# Patient Record
Sex: MALE | Race: WHITE | NOT HISPANIC OR LATINO | Employment: OTHER | URBAN - METROPOLITAN AREA
[De-identification: names, ages, dates, MRNs, and addresses within clinical notes are randomized per-mention and may not be internally consistent; named-entity substitution may affect disease eponyms.]

---

## 2017-01-03 ENCOUNTER — HOSPITAL ENCOUNTER (OUTPATIENT)
Dept: SLEEP MEDICINE | Facility: HOSPITAL | Age: 47
Discharge: HOME | End: 2017-01-03
Attending: INTERNAL MEDICINE | Admitting: INTERNAL MEDICINE

## 2017-01-03 VITALS
HEART RATE: 87 BPM | HEIGHT: 70 IN | DIASTOLIC BLOOD PRESSURE: 76 MMHG | WEIGHT: 249 LBS | BODY MASS INDEX: 35.65 KG/M2 | OXYGEN SATURATION: 96 % | SYSTOLIC BLOOD PRESSURE: 143 MMHG

## 2017-01-03 DIAGNOSIS — G47.61 PERIODIC LIMB MOVEMENT DISORDER (PLMD): ICD-10-CM

## 2017-01-03 DIAGNOSIS — G47.10 HYPERSOMNIA: ICD-10-CM

## 2017-01-03 DIAGNOSIS — G47.33 OBSTRUCTIVE SLEEP APNEA SYNDROME: ICD-10-CM

## 2017-01-03 PROCEDURE — 95810 POLYSOM 6/> YRS 4/> PARAM: CPT | Performed by: INTERNAL MEDICINE

## 2017-01-04 PROCEDURE — 95810 POLYSOM 6/> YRS 4/> PARAM: CPT | Performed by: INTERNAL MEDICINE

## 2017-01-10 ENCOUNTER — TELEPHONE (OUTPATIENT)
Dept: CARDIOLOGY | Facility: CLINIC | Age: 47
End: 2017-01-10

## 2017-01-10 DIAGNOSIS — I48.0 PAROXYSMAL ATRIAL FIBRILLATION (HCC): Primary | ICD-10-CM

## 2017-01-10 RX ORDER — FLECAINIDE ACETATE 100 MG/1
100 TABLET ORAL 2 TIMES DAILY
Qty: 60 TABLET | Refills: 5 | Status: SHIPPED | OUTPATIENT
Start: 2017-01-10 | End: 2017-08-30 | Stop reason: SDUPTHER

## 2017-01-10 NOTE — TELEPHONE ENCOUNTER
Per GFT I instructed patient to Stop Rythmol. Wait 3 days and start Tambocor 100 mg BID. Then have an EKG 48 hours later. Patient is agreeable. He is going to see Dr Milian on 1/17/17 and will have EKG done there and fax it to our office. RX sent to pharmacy for Tambocor.

## 2017-01-10 NOTE — TELEPHONE ENCOUNTER
Patient called and he had just gotten your message. He said that the best number to reach him at is [phone].

## 2017-01-24 ENCOUNTER — OUTSIDE FACILITY SERVICE (OUTPATIENT)
Dept: CARDIOLOGY | Facility: CLINIC | Age: 47
End: 2017-01-24

## 2017-01-24 PROCEDURE — 93270 REMOTE 30 DAY ECG REV/REPORT: CPT | Performed by: INTERNAL MEDICINE

## 2017-01-24 PROCEDURE — 93272 ECG/REVIEW INTERPRET ONLY: CPT | Performed by: INTERNAL MEDICINE

## 2017-01-27 ENCOUNTER — TELEPHONE (OUTPATIENT)
Dept: CARDIOLOGY | Facility: CLINIC | Age: 47
End: 2017-01-27

## 2017-01-27 RX ORDER — DILTIAZEM HYDROCHLORIDE 240 MG/1
240 CAPSULE, COATED, EXTENDED RELEASE ORAL DAILY
Qty: 30 CAPSULE | Refills: 5 | Status: SHIPPED | OUTPATIENT
Start: 2017-01-27 | End: 2017-08-02 | Stop reason: SDUPTHER

## 2017-01-27 NOTE — TELEPHONE ENCOUNTER
GFT reviewed event monitor and would like pt to increase Diltiazem to 240mg daily.  I spoke with pt, he will discontinue the 180mg and start the 240 monitoring HR and BP.

## 2017-03-16 ENCOUNTER — OFFICE VISIT (OUTPATIENT)
Dept: CARDIOLOGY | Facility: CLINIC | Age: 47
End: 2017-03-16

## 2017-03-16 VITALS
HEIGHT: 70 IN | WEIGHT: 240 LBS | BODY MASS INDEX: 34.36 KG/M2 | SYSTOLIC BLOOD PRESSURE: 132 MMHG | HEART RATE: 66 BPM | DIASTOLIC BLOOD PRESSURE: 84 MMHG

## 2017-03-16 DIAGNOSIS — I48.0 PAROXYSMAL A-FIB (HCC): Primary | ICD-10-CM

## 2017-03-16 DIAGNOSIS — I48.4 ATYPICAL ATRIAL FLUTTER (HCC): ICD-10-CM

## 2017-03-16 PROBLEM — I48.92 ATRIAL FLUTTER: Status: ACTIVE | Noted: 2017-03-16

## 2017-03-16 PROCEDURE — 93000 ELECTROCARDIOGRAM COMPLETE: CPT | Performed by: INTERNAL MEDICINE

## 2017-03-16 PROCEDURE — 99213 OFFICE O/P EST LOW 20 MIN: CPT | Performed by: INTERNAL MEDICINE

## 2017-03-16 RX ORDER — ATORVASTATIN CALCIUM 20 MG/1
20 TABLET, FILM COATED ORAL DAILY
COMMUNITY
End: 2021-08-19 | Stop reason: ALTCHOICE

## 2017-03-16 RX ORDER — PROPAFENONE HYDROCHLORIDE 300 MG/1
300 TABLET, COATED ORAL DAILY
COMMUNITY
End: 2017-08-31 | Stop reason: ALTCHOICE

## 2017-03-16 NOTE — PROGRESS NOTES
Bakari Rosales  1970  [phone]      03/16/2017    [facility] CARDIOLOGY     DAYSI Hager  [address]   [place] [id number]    Chief Complaint   Patient presents with   • Atrial Fibrillation         Problem List:   1. Atrial fibrillation: CHADSvasc = 1  a. Holter monitor, 12/08/2010, with heart rates of , average 52 beats per minute with 3% PACs. No PVCs and normal sinus rhythm.   b. Echocardiogram, 12/03/2012: Normal study, EF 60%.   c. GXT, February 2013: Normal study per patient, no data.   d. Holter monitor, 07/30/2013, no data, showing apparent atrial fibrillation.  e. Transesophageal echocardiogram, August 2013, showing thrombus, Xarelto initiated, Rythmol changed to sotalol, no external cardioversion performed.   f. EKG, 08/17/2013, showed atrial fibrillation at 58 beats per minute.   g. Failed sotalol: Tikosyn initiation, 02/11/2014.   h. PV isolation procedure, 09/09/2014: Abnormal CT scan of the chest, 09/08/2014, with enlarged lymph nodes in the mediastinum.   i. 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 Event Monitor 12/2016-1/2017 showing nsr, ST, atrial flutter, stopped Rythmol and started Flecainide  2. Emphysema/chronic obstructive pulmonary disease:  a. Abnormal CT scan of the chest, 09/08/2014 with enlarged lymph nodes in the mediastinum.   b. Repeat CT scan of the chest, 11/19/2014, reactive lymph nodes in the mediastinum, predominant interstitial nodularity and linear densities and possible emphysema with early fibrosis.   c. Repeat CT scan 10/2016: There are fairly subtle peripheral interstitial changes noted primarily in the lower lobes. However there are no features of kit pulmonary fibrosis. There is no bronchiectasis, evidence of acute inflammatory process or mass  3. GXT, February 2013: Normal study per patient, no data.    4. Hypothyroidism.  5. Dyslipidemia.  6. Anxiety.  7. Tobacco abuse.  8. Hypertension.    Allergies  No Known Allergies    Current Medications    Current Outpatient Prescriptions:   •  ALPRAZolam (XANAX) 2 MG tablet, Take 2 mg by mouth 3 (three) times a day as needed., Disp: , Rfl:   •  atorvastatin (LIPITOR) 20 MG tablet, Take 20 mg by mouth Daily., Disp: , Rfl:   •  diltiaZEM CD (CARDIZEM CD) 240 MG 24 hr capsule, Take 1 capsule by mouth Daily., Disp: 30 capsule, Rfl: 5  •  Ergocalciferol (VITAMIN D2 PO), Take 50,000 Units by mouth 1 (one) time per week., Disp: , Rfl:   •  flecainide (TAMBOCOR) 100 MG tablet, Take 1 tablet by mouth 2 (Two) Times a Day., Disp: 60 tablet, Rfl: 5  •  furosemide (LASIX) 20 MG tablet, Take 20 mg by mouth daily as needed., Disp: , Rfl:   •  gemfibrozil (LOPID) 600 MG tablet, Take 600 mg by mouth 2 (two) times a day before meals., Disp: , Rfl:   •  hydrOXYzine (VISTARIL) 50 MG capsule, take 1 capsule by mouth four times a day, Disp: , Rfl: 0  •  levothyroxine (SYNTHROID, LEVOTHROID) 100 MCG tablet, Take 100 mcg by mouth., Disp: , Rfl:   •  metoprolol tartrate (LOPRESSOR) 50 MG tablet, Take 50 mg by mouth 2 (two) times a day., Disp: , Rfl:   •  pantoprazole (PROTONIX) 40 MG EC tablet, Take 1 tablet by mouth daily., Disp: 30 tablet, Rfl: 6  •  propafenone (RYTHMOL) 300 MG tablet, Take 300 mg by mouth 2 (Two) Times a Day., Disp: , Rfl:   •  RA LAXATIVE 5 MG EC tablet, take 1 tablet by mouth twice a day, Disp: , Rfl: 0  •  rivaroxaban (XARELTO) 20 MG tablet, Take 20 mg by mouth daily with dinner., Disp: , Rfl:     History of Present Illness   HPI    Pt presents for follow up of atrial fibrillation and flutter. Since we last saw the pt, he wore an event monitor which showed nsr, ST, and short runs of SVT/atrial flutter. Dr. Luz stopped his Rythmol and started Flecainide. However, the patient misunderstood and has been taking both. He still has daily episodes of palpitations and atrial  "fibrillation lasting anywhere from 20 minutes to several hours. He feels terrible and struggles with anxiety, depression and hopelessness. Because of this atrial fib, he cannot do anything he wants to do. He has gained weight. He is cutting back on smoking. He had sleep study which did not show need for CPAP.     ROS:  General:  Positive fatigue, weight gain   Cardiovascular:  Denies CP, PND, syncope, near syncope, edema or palpitations.  Pulmonary:  Denies SALAZAR, cough, or wheezing      Vitals:    03/16/17 1107   BP: 132/84   BP Location: Left arm   Patient Position: Sitting   Pulse: 66   Weight: 240 lb (109 kg)   Height: 70\" (177.8 cm)     PE:  General: NAD  Neck: no JVD, no carotid bruits, no TM  Heart RRR, NL S1, S2, S4 present, no rubs, murmurs  Lungs: CTA, no wheezes, rhonchi, or rales  Abd: soft, non-tender, NL BS  Ext: No musculoskeletal deformities, no edema, cyanosis, or clubbing  Psych: normal mood and affect    Diagnostic Data:    ECG 12 Lead  Date/Time: 3/16/2017 12:34 PM  Performed by: DANIELLE LUZ  Authorized by: DANIELLE LUZ   Comparison: compared with previous ECG from 12/5/2016  Rhythm: sinus rhythm  BPM: 66              1. Paroxysmal a-fib    2. Atypical atrial flutter          Plan:  1) PAF/atrial flutter- monitor shows mainly nsr with rare short runs of AT/SVT: Pt cannot discern between nsr and short runs of AT. Will continued on Tambocor only. Monitor for now    2) Anticoagulation: CHADvasc = 1  Continue Xarelto  3) HTN- controlled  Wt loss, exercise, salt reduction    Scribed for Danielle Luz MD by Bhavya Locke PA-C. 3/16/2017  12:08 PM     IDanielle MD, personally performed the services face to face as described in this documentation and as scribed by the above named individual in my presence, and it is both accurate and complete.  3/16/2017  12:33 PM        "

## 2017-08-03 RX ORDER — DILTIAZEM HYDROCHLORIDE 240 MG/1
CAPSULE, EXTENDED RELEASE ORAL
Qty: 30 CAPSULE | Refills: 5 | Status: SHIPPED | OUTPATIENT
Start: 2017-08-03 | End: 2018-02-02 | Stop reason: SDUPTHER

## 2017-08-29 ENCOUNTER — OFFICE VISIT (OUTPATIENT)
Dept: CARDIOLOGY | Facility: CLINIC | Age: 47
End: 2017-08-29

## 2017-08-29 VITALS
SYSTOLIC BLOOD PRESSURE: 105 MMHG | WEIGHT: 230 LBS | BODY MASS INDEX: 32.93 KG/M2 | DIASTOLIC BLOOD PRESSURE: 67 MMHG | HEART RATE: 65 BPM | HEIGHT: 70 IN

## 2017-08-29 DIAGNOSIS — I10 ESSENTIAL HYPERTENSION: ICD-10-CM

## 2017-08-29 DIAGNOSIS — I48.0 PAROXYSMAL A-FIB (HCC): Primary | ICD-10-CM

## 2017-08-29 PROCEDURE — 93000 ELECTROCARDIOGRAM COMPLETE: CPT | Performed by: PHYSICIAN ASSISTANT

## 2017-08-29 PROCEDURE — 99213 OFFICE O/P EST LOW 20 MIN: CPT | Performed by: INTERNAL MEDICINE

## 2017-08-29 NOTE — PROGRESS NOTES
Bakari Rosales  1970  [phone]      08/29/2017    [facility] CARDIOLOGY     DAYSI Hager  [address]   [place] [id number]    Chief Complaint   Patient presents with   • Atrial Fibrillation       Problem List:   Problem List:   1. Atrial fibrillation: CHADSvasc = 1  a. Holter monitor, 12/08/2010, with heart rates of , average 52 beats per minute with 3% PACs. No PVCs and normal sinus rhythm.   b. Echocardiogram, 12/03/2012: Normal study, EF 60%.   c. GXT, February 2013: Normal study per patient, no data.   d. Holter monitor, 07/30/2013, no data, showing apparent atrial fibrillation.  e. Transesophageal echocardiogram, August 2013, showing thrombus, Xarelto initiated, Rythmol changed to sotalol, no external cardioversion performed.   f. EKG, 08/17/2013, showed atrial fibrillation at 58 beats per minute.   g. Failed sotalol: Tikosyn initiation, 02/11/2014.   h. PV isolation procedure, 09/09/2014: Abnormal CT scan of the chest, 09/08/2014, with enlarged lymph nodes in the mediastinum.   i. 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 Event Monitor 12/2016-1/2017 showing nsr, ST, atrial flutter, stopped Rythmol and started Flecainide  2. Emphysema/chronic obstructive pulmonary disease:  a. Abnormal CT scan of the chest, 09/08/2014 with enlarged lymph nodes in the mediastinum.   b. Repeat CT scan of the chest, 11/19/2014, reactive lymph nodes in the mediastinum, predominant interstitial nodularity and linear densities and possible emphysema with early fibrosis.   c. Repeat CT scan 10/2016: There are fairly subtle peripheral interstitial changes noted primarily in the lower lobes. However there are no features of kit pulmonary fibrosis. There is no bronchiectasis, evidence of acute inflammatory process or mass  3. GXT, February 2013: Normal study per patient, no data.    4. Hypothyroidism.  5. Dyslipidemia.  6. Anxiety.  7. Tobacco abuse.  8. Hypertension.    Allergies  No Known Allergies    Current Medications    Current Outpatient Prescriptions:   •  ALPRAZolam (XANAX) 2 MG tablet, Take 2 mg by mouth 3 (three) times a day as needed., Disp: , Rfl:   •  atorvastatin (LIPITOR) 20 MG tablet, Take 20 mg by mouth Daily., Disp: , Rfl:   •  CARTIA  MG 24 hr capsule, take 1 capsule by mouth once daily, Disp: 30 capsule, Rfl: 5  •  Ergocalciferol (VITAMIN D2 PO), Take 50,000 Units by mouth 1 (one) time per week., Disp: , Rfl:   •  flecainide (TAMBOCOR) 100 MG tablet, Take 1 tablet by mouth 2 (Two) Times a Day., Disp: 60 tablet, Rfl: 5  •  furosemide (LASIX) 20 MG tablet, Take 20 mg by mouth daily as needed., Disp: , Rfl:   •  gemfibrozil (LOPID) 600 MG tablet, Take 600 mg by mouth 2 (two) times a day before meals., Disp: , Rfl:   •  hydrOXYzine (VISTARIL) 50 MG capsule, take 1 capsule by mouth four times a day, Disp: , Rfl: 0  •  levothyroxine (SYNTHROID, LEVOTHROID) 100 MCG tablet, Take 100 mcg by mouth., Disp: , Rfl:   •  metoprolol tartrate (LOPRESSOR) 50 MG tablet, Take 50 mg by mouth 2 (two) times a day., Disp: , Rfl:   •  pantoprazole (PROTONIX) 40 MG EC tablet, Take 1 tablet by mouth daily., Disp: 30 tablet, Rfl: 6  •  propafenone (RYTHMOL) 300 MG tablet, Take 300 mg by mouth Daily., Disp: , Rfl:   •  RA LAXATIVE 5 MG EC tablet, take 1 tablet by mouth twice a day, Disp: , Rfl: 0  •  rivaroxaban (XARELTO) 20 MG tablet, Take 20 mg by mouth daily with dinner., Disp: , Rfl:     History of Present Illness   HPI    Pt presents for follow up of atrial fibrillation on flecainide. Since we last saw the pt, pt has only rare AF episodes, not lasting long. He has occasional SOB. No anginal type CP, LH, and dizziness. Denies any hospitalizations, ER visits, bleeding, or TIA/CVA symptoms. Overall feels well.    ROS:  General:  Denies fatigue, weight gain or loss  Cardiovascular:  Denies  "CP, PND, syncope, near syncope, edema or palpitations.  Pulmonary:  + SALAZAR, - cough, or wheezing      Vitals:    08/29/17 1253   BP: 105/67   BP Location: Left arm   Patient Position: Sitting   Pulse: 65   Weight: 230 lb (104 kg)   Height: 70\" (177.8 cm)     PE:  General: NAD  Neck: no JVD, no carotid bruits, no TM  Heart RRR, NL S1, S2, S4 present, no rubs, murmurs  Lungs: CTA, no wheezes, rhonchi, or rales  Abd: soft, non-tender, NL BS  Ext: No musculoskeletal deformities, no edema, cyanosis, or clubbing  Psych: normal mood and affect    Diagnostic Data:        ECG 12 Lead  Date/Time: 8/29/2017 1:21 PM  Performed by: BONIFACIO NEWMAN  Authorized by: BONIFACIO NEWMAN   Comparison: compared with previous ECG from 3/16/2017  Similar to previous ECG  Rhythm: sinus rhythm  BPM: 65              1. Paroxysmal a-fib    2. Essential hypertension          Plan:  1) PAF/atrial flutter- controlled on Flecainide   2) Anticoagulation: CHADvasc = 1  Continue Xarelto  3) HTN- controlled  Wt loss, exercise, salt reduction    F/up in 6 months    Scribed for Rick Luz MD by Bonifacio Newman PA-C. 8/29/2017  1:24 PM    "

## 2017-08-31 RX ORDER — FLECAINIDE ACETATE 100 MG/1
100 TABLET ORAL 2 TIMES DAILY
Qty: 60 TABLET | Refills: 6 | Status: SHIPPED | OUTPATIENT
Start: 2017-08-31 | End: 2018-09-26 | Stop reason: SDUPTHER

## 2017-08-31 RX ORDER — FLECAINIDE ACETATE 100 MG/1
TABLET ORAL
Qty: 60 TABLET | Refills: 5 | Status: SHIPPED | OUTPATIENT
Start: 2017-08-31 | End: 2017-08-31 | Stop reason: SDUPTHER

## 2018-02-02 RX ORDER — DILTIAZEM HYDROCHLORIDE 240 MG/1
CAPSULE, EXTENDED RELEASE ORAL
Qty: 30 CAPSULE | Refills: 5 | Status: SHIPPED | OUTPATIENT
Start: 2018-02-02 | End: 2018-08-15 | Stop reason: SDUPTHER

## 2018-03-14 NOTE — PROGRESS NOTES
Bakari Rosales  1970  [phone]      03/15/2018    [facility] CARDIOLOGY     S[place] Roche, [place]  [address]   [place] [facility] [id number]    No chief complaint on file.      Problem List:   1. Atrial fibrillation: CHADSvasc = 1  a. Holter monitor, 12/08/2010, with heart rates of , average 52 beats per minute with 3% PACs. No PVCs and normal sinus rhythm.   b. Echocardiogram, 12/03/2012: Normal study, EF 60%.   c. GXT, February 2013: Normal study per patient, no data.   d. Holter monitor, 07/30/2013, no data, showing apparent atrial fibrillation.  e. Transesophageal echocardiogram, August 2013, showing thrombus, Xarelto initiated, Rythmol changed to sotalol, no external cardioversion performed.   f. EKG, 08/17/2013, showed atrial fibrillation at 58 beats per minute.   g. Failed sotalol: Tikosyn initiation, 02/11/2014.   h. PV isolation procedure, 09/09/2014: Abnormal CT scan of the chest, 09/08/2014, with enlarged lymph nodes in the mediastinum.   i. 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 Event Monitor 12/2016-1/2017 showing nsr, ST, atrial flutter, stopped Rythmol and started Flecainide  2. Emphysema/chronic obstructive pulmonary disease:  a. Abnormal CT scan of the chest, 09/08/2014 with enlarged lymph nodes in the mediastinum.   b. Repeat CT scan of the chest, 11/19/2014, reactive lymph nodes in the mediastinum, predominant interstitial nodularity and linear densities and possible emphysema with early fibrosis.   c. Repeat CT scan 10/2016: There are fairly subtle peripheral interstitial changes noted primarily in the lower lobes. However there are no features of kit pulmonary fibrosis. There is no bronchiectasis, evidence of acute inflammatory process or mass  3. GXT, February 2013: Normal study per patient, no data.   4. Hypothyroidism.  5. Dyslipidemia.  6. Anxiety.  7. Tobacco  abuse.  8. Hypertension.    Allergies  No Known Allergies    Current Medications    Current Outpatient Prescriptions:   •  ALPRAZolam (XANAX) 2 MG tablet, Take 2 mg by mouth 3 (three) times a day as needed., Disp: , Rfl:   •  atorvastatin (LIPITOR) 20 MG tablet, Take 20 mg by mouth Daily., Disp: , Rfl:   •  CARTIA  MG 24 hr capsule, take 1 capsule by mouth once daily, Disp: 30 capsule, Rfl: 5  •  Ergocalciferol (VITAMIN D2 PO), Take 50,000 Units by mouth 1 (one) time per week., Disp: , Rfl:   •  flecainide (TAMBOCOR) 100 MG tablet, Take 1 tablet by mouth 2 (Two) Times a Day., Disp: 60 tablet, Rfl: 6  •  furosemide (LASIX) 20 MG tablet, Take 20 mg by mouth daily as needed., Disp: , Rfl:   •  gemfibrozil (LOPID) 600 MG tablet, Take 600 mg by mouth 2 (two) times a day before meals., Disp: , Rfl:   •  hydrOXYzine (VISTARIL) 50 MG capsule, take 1 capsule by mouth four times a day, Disp: , Rfl: 0  •  levothyroxine (SYNTHROID, LEVOTHROID) 100 MCG tablet, Take 100 mcg by mouth., Disp: , Rfl:   •  metoprolol tartrate (LOPRESSOR) 50 MG tablet, Take 50 mg by mouth 2 (two) times a day., Disp: , Rfl:   •  pantoprazole (PROTONIX) 40 MG EC tablet, Take 1 tablet by mouth daily., Disp: 30 tablet, Rfl: 6  •  RA LAXATIVE 5 MG EC tablet, take 1 tablet by mouth twice a day, Disp: , Rfl: 0  •  rivaroxaban (XARELTO) 20 MG tablet, Take 20 mg by mouth daily with dinner., Disp: , Rfl:     History of Present Illness   HPI    Pt presents for follow up of atrial fibrillation on flecainide. Since we last saw the pt, pt denies any AF episodes, SOB, CP, LH, and dizziness. Denies any hospitalizations, ER visits, bleeding on Xarelto, or TIA/CVA symptoms. Overall feels well.    ROS:  General:  Denies fatigue, weight gain or loss  Cardiovascular:  Denies CP, PND, syncope, near syncope, edema or palpitations.  Pulmonary:  Denies SALAZAR, cough, or wheezing      There were no vitals filed for this visit.  PE:  General: NAD  Neck: no JVD, no carotid  bruits, no TM  Heart RRR, NL S1, S2, S4 present, no rubs, murmurs  Lungs: CTA, no wheezes, rhonchi, or rales  Abd: soft, non-tender, NL BS  Ext: No musculoskeletal deformities, no edema, cyanosis, or clubbing  Psych: normal mood and affect    Diagnostic Data:      Procedures    1. Paroxysmal a-fib    2. Essential hypertension          Plan:  1) Paroxysmal atrial fibrillation/flutter:  - On flecainide  - Continue present medications.   2) Anticoagulation  - Continue Xarelto  3) HTN  - Well controlled on current regimen  - Wt loss, exercise, salt reduction    F/up in 6 months

## 2018-03-15 ENCOUNTER — OFFICE VISIT (OUTPATIENT)
Dept: CARDIOLOGY | Facility: CLINIC | Age: 48
End: 2018-03-15

## 2018-03-15 VITALS
DIASTOLIC BLOOD PRESSURE: 72 MMHG | BODY MASS INDEX: 32.21 KG/M2 | HEART RATE: 97 BPM | WEIGHT: 225 LBS | SYSTOLIC BLOOD PRESSURE: 130 MMHG | HEIGHT: 70 IN

## 2018-03-15 DIAGNOSIS — I48.0 PAROXYSMAL A-FIB (HCC): Primary | ICD-10-CM

## 2018-03-15 DIAGNOSIS — I10 ESSENTIAL HYPERTENSION: ICD-10-CM

## 2018-03-15 DIAGNOSIS — Z72.0 TOBACCO ABUSE: ICD-10-CM

## 2018-03-15 PROCEDURE — 99213 OFFICE O/P EST LOW 20 MIN: CPT | Performed by: INTERNAL MEDICINE

## 2018-03-15 PROCEDURE — 93000 ELECTROCARDIOGRAM COMPLETE: CPT | Performed by: INTERNAL MEDICINE

## 2018-03-15 RX ORDER — FENOFIBRATE 145 MG/1
145 TABLET, COATED ORAL DAILY
COMMUNITY

## 2018-03-15 RX ORDER — CITALOPRAM 20 MG/1
20 TABLET ORAL DAILY
COMMUNITY
End: 2021-08-19 | Stop reason: ALTCHOICE

## 2018-03-15 NOTE — PROGRESS NOTES
Bakari Rosales  1970  [phone]      03/15/2018    [facility] CARDIOLOGY     DAYSI Hager  [address]   [place] [id number]    Chief Complaint   Patient presents with   • Atrial Fibrillation         Problem List:   1. Atrial fibrillation: CHADSvasc = 1  a. Holter monitor, 12/08/2010, with heart rates of , average 52 beats per minute with 3% PACs. No PVCs and normal sinus rhythm.   b. Echocardiogram, 12/03/2012: Normal study, EF 60%.   c. GXT, February 2013: Normal study per patient, no data.   d. Holter monitor, 07/30/2013, no data, showing apparent atrial fibrillation.  e. Transesophageal echocardiogram, August 2013, showing thrombus, Xarelto initiated, Rythmol changed to sotalol, no external cardioversion performed.   f. EKG, 08/17/2013, showed atrial fibrillation at 58 beats per minute.   g. Failed sotalol: Tikosyn initiation, 02/11/2014.   h. PV isolation procedure, 09/09/2014: Abnormal CT scan of the chest, 09/08/2014, with enlarged lymph nodes in the mediastinum.   i. 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 Event Monitor 12/2016-1/2017 showing nsr, ST, atrial flutter, stopped Rythmol and started Flecainide  2. Emphysema/chronic obstructive pulmonary disease:  a. Abnormal CT scan of the chest, 09/08/2014 with enlarged lymph nodes in the mediastinum.   b. Repeat CT scan of the chest, 11/19/2014, reactive lymph nodes in the mediastinum, predominant interstitial nodularity and linear densities and possible emphysema with early fibrosis.   c. Repeat CT scan 10/2016: There are fairly subtle peripheral interstitial changes noted primarily in the lower lobes. However there are no features of kit pulmonary fibrosis. There is no bronchiectasis, evidence of acute inflammatory process or mass  3. GXT, February 2013: Normal study per patient, no data.    4. Hypothyroidism.  5. Dyslipidemia.  6. Anxiety.  7. Tobacco abuse.  8. Hypertension.    Allergies  No Known Allergies    Current Medications    Current Outpatient Prescriptions:   •  ALPRAZolam (XANAX) 2 MG tablet, Take 2 mg by mouth 3 (three) times a day as needed., Disp: , Rfl:   •  atorvastatin (LIPITOR) 20 MG tablet, Take 20 mg by mouth Daily., Disp: , Rfl:   •  CARTIA  MG 24 hr capsule, take 1 capsule by mouth once daily, Disp: 30 capsule, Rfl: 5  •  citalopram (CeleXA) 20 MG tablet, Take 20 mg by mouth Daily., Disp: , Rfl:   •  Ergocalciferol (VITAMIN D2 PO), Take 50,000 Units by mouth 1 (one) time per week., Disp: , Rfl:   •  fenofibrate (TRICOR) 145 MG tablet, Take 145 mg by mouth Daily., Disp: , Rfl:   •  flecainide (TAMBOCOR) 100 MG tablet, Take 1 tablet by mouth 2 (Two) Times a Day., Disp: 60 tablet, Rfl: 6  •  furosemide (LASIX) 20 MG tablet, Take 20 mg by mouth daily as needed., Disp: , Rfl:   •  gemfibrozil (LOPID) 600 MG tablet, Take 600 mg by mouth 2 (two) times a day before meals., Disp: , Rfl:   •  levothyroxine (SYNTHROID, LEVOTHROID) 100 MCG tablet, Take 100 mcg by mouth., Disp: , Rfl:   •  metoprolol tartrate (LOPRESSOR) 50 MG tablet, Take 50 mg by mouth 2 (two) times a day., Disp: , Rfl:   •  pantoprazole (PROTONIX) 40 MG EC tablet, Take 1 tablet by mouth daily., Disp: 30 tablet, Rfl: 6  •  rivaroxaban (XARELTO) 20 MG tablet, Take 20 mg by mouth daily with dinner., Disp: , Rfl:     History of Present Illness   HPI    Pt presents for follow up of atrial fibrillation on Flecainide . Since we last saw the pt, pt denies any significant AF episodes. Occasionally, he has short episodes of palpitations. He has mild SOB but is still smoking. He is trying to quit and is at less than 1/2 PPD. He denies anginal type CP. He had one episode of syncope at home where he felt dizzy and then passed out. Denies any hospitalizations, ER visits, bleeding, or TIA/CVA symptoms. Overall feels  "well.    ROS:  General:  Denies fatigue, weight gain or loss  Cardiovascular:  Denies CP, PND, +syncope,-  near syncope, edema + palpitations.  Pulmonary:  Denies SALAZAR,+  cough, or wheezing      Vitals:    03/15/18 1217   BP: 130/72   BP Location: Left arm   Patient Position: Sitting   Pulse: 97   Weight: 102 kg (225 lb)   Height: 177.8 cm (70\")     Body mass index is 32.28 kg/m².  PE:  General: NAD  Neck: no JVD, no carotid bruits, no TM  Heart RRR, NL S1, S2, S4 present, no rubs, murmurs  Lungs: CTA, + wheezes, - rhonchi, or rales  Abd: soft, non-tender, NL BS  Ext: No musculoskeletal deformities, no edema, cyanosis, or clubbing  Psych: normal mood and affect    Diagnostic Data:        ECG 12 Lead  Date/Time: 3/15/2018 12:40 PM  Performed by: BONIFACIO NEWMAN  Authorized by: BONIFACIO NEWMAN   Comparison: compared with previous ECG from 8/29/2017  Similar to previous ECG  Rhythm: sinus rhythm  BPM: 96              1. Paroxysmal a-fib    2. Essential hypertension    3. Tobacco abuse          Plan:  1) PAF - overall well controlled on Flecainide.   Continue present medications.   2) Anticoagulation: CHADSVasc = 1  Continue Xarelto  3) HTN- overall controlled  Wt loss, exercise, salt reduction  4) tobacco abuse - counseled cessation  F/up in 6 -8 months    [facility]  3/15/2018   12:42 PM  I saw this patient independently.     "

## 2018-05-02 RX ORDER — PANTOPRAZOLE SODIUM 40 MG/1
TABLET, DELAYED RELEASE ORAL
Qty: 30 TABLET | Refills: 6 | OUTPATIENT
Start: 2018-05-02

## 2018-08-15 RX ORDER — DILTIAZEM HYDROCHLORIDE 240 MG/1
CAPSULE, COATED, EXTENDED RELEASE ORAL
Qty: 30 CAPSULE | Refills: 5 | Status: SHIPPED | OUTPATIENT
Start: 2018-08-15 | End: 2019-02-16 | Stop reason: SDUPTHER

## 2018-09-26 RX ORDER — FLECAINIDE ACETATE 100 MG/1
TABLET ORAL
Qty: 60 TABLET | Refills: 11 | Status: SHIPPED | OUTPATIENT
Start: 2018-09-26 | End: 2019-10-25 | Stop reason: SDUPTHER

## 2018-12-20 ENCOUNTER — OFFICE VISIT (OUTPATIENT)
Dept: CARDIOLOGY | Facility: CLINIC | Age: 48
End: 2018-12-20

## 2018-12-20 DIAGNOSIS — J43.1 PANLOBULAR EMPHYSEMA (HCC): ICD-10-CM

## 2018-12-20 DIAGNOSIS — Z72.0 TOBACCO ABUSE: ICD-10-CM

## 2018-12-20 DIAGNOSIS — I48.4 ATYPICAL ATRIAL FLUTTER (HCC): ICD-10-CM

## 2018-12-20 DIAGNOSIS — I10 ESSENTIAL HYPERTENSION: ICD-10-CM

## 2018-12-20 DIAGNOSIS — I48.0 PAROXYSMAL A-FIB (HCC): Primary | ICD-10-CM

## 2018-12-20 PROCEDURE — 93000 ELECTROCARDIOGRAM COMPLETE: CPT | Performed by: INTERNAL MEDICINE

## 2018-12-20 PROCEDURE — 99213 OFFICE O/P EST LOW 20 MIN: CPT | Performed by: INTERNAL MEDICINE

## 2018-12-20 NOTE — PROGRESS NOTES
Bakari Rosales  1970  [phone]    12/20/2018    [facility] CARDIOLOGY     Sachin Roche PA  [address] [facility]   [place] [id number]    Chief Complaint   Patient presents with   • Atrial Fibrillation       Problem List:   1. Atrial fibrillation: CHADSvasc = 1  a. Holter monitor, 12/08/2010, with heart rates of , average 52 beats per minute with 3% PACs. No PVCs and normal sinus rhythm.   b. Echocardiogram, 12/03/2012: Normal study, EF 60%.   c. GXT, February 2013: Normal study per patient, no data.   d. Holter monitor, 07/30/2013, no data, showing apparent atrial fibrillation.  e. Transesophageal echocardiogram, August 2013, showing thrombus, Xarelto initiated, Rythmol changed to sotalol, no external cardioversion performed.   f. EKG, 08/17/2013, showed atrial fibrillation at 58 beats per minute.   g. Failed sotalol: Tikosyn initiation, 02/11/2014.   h. PV isolation procedure, 09/09/2014: Abnormal CT scan of the chest, 09/08/2014, with enlarged lymph nodes in the mediastinum.   i. 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 Event Monitor 12/2016-1/2017 showing nsr, ST, atrial flutter, stopped Rythmol and started Flecainide  2. Emphysema/chronic obstructive pulmonary disease:  a. Abnormal CT scan of the chest, 09/08/2014 with enlarged lymph nodes in the mediastinum.   b. Repeat CT scan of the chest, 11/19/2014, reactive lymph nodes in the mediastinum, predominant interstitial nodularity and linear densities and possible emphysema with early fibrosis.   c. Repeat CT scan 10/2016: There are fairly subtle peripheral interstitial changes noted primarily in the lower lobes. However there are no features of kit pulmonary fibrosis. There is no bronchiectasis, evidence of acute inflammatory process or mass  3. GXT, February 2013: Normal study per patient, no data.    4. Hypothyroidism.  5. Dyslipidemia.  6. Anxiety.  7. Tobacco abuse.  8. Hypertension.  9. Anxiety/depression  Allergies  No Known Allergies    Current Medications    Current Outpatient Medications:   •  ALPRAZolam (XANAX) 2 MG tablet, Take 2 mg by mouth 3 (three) times a day as needed., Disp: , Rfl:   •  atorvastatin (LIPITOR) 20 MG tablet, Take 20 mg by mouth Daily., Disp: , Rfl:   •  citalopram (CeleXA) 20 MG tablet, Take 20 mg by mouth Daily., Disp: , Rfl:   •  diltiaZEM CD (CARDIZEM CD) 240 MG 24 hr capsule, take 1 capsule by mouth once daily, Disp: 30 capsule, Rfl: 5  •  Ergocalciferol (VITAMIN D2 PO), Take 50,000 Units by mouth 1 (one) time per week., Disp: , Rfl:   •  fenofibrate (TRICOR) 145 MG tablet, Take 145 mg by mouth Daily., Disp: , Rfl:   •  flecainide (TAMBOCOR) 100 MG tablet, take 1 tablet by mouth twice a day, Disp: 60 tablet, Rfl: 11  •  furosemide (LASIX) 20 MG tablet, Take 20 mg by mouth daily as needed., Disp: , Rfl:   •  gemfibrozil (LOPID) 600 MG tablet, Take 600 mg by mouth 2 (two) times a day before meals., Disp: , Rfl:   •  levothyroxine (SYNTHROID, LEVOTHROID) 100 MCG tablet, Take 100 mcg by mouth., Disp: , Rfl:   •  metoprolol tartrate (LOPRESSOR) 50 MG tablet, Take 50 mg by mouth 2 (two) times a day., Disp: , Rfl:   •  pantoprazole (PROTONIX) 40 MG EC tablet, Take 1 tablet by mouth daily., Disp: 30 tablet, Rfl: 6  •  rivaroxaban (XARELTO) 20 MG tablet, Take 20 mg by mouth daily with dinner., Disp: , Rfl:     History of Present Illness     Pt presents for follow up of AF/HTN . Since we last saw the pt, pt continues to have problems with anxiety and depression.  He's been scheduled to see a psychologist in the upcoming few months.  He has cut his cigarettes back to half a pack a day but continues to smoke.  He does have increasing episodes of wheezes recently associated with some allergies.  He denies any significant chest tightness.  He does have dyspnea on exertion which is chronic  and unchanged.  He does complain of rare palpitations but for the most part are short-lived lasting less than a few minutes in duration.  He is tolerating his medications without difficulty.  Denies any hospitalizations, ER visits, bleeding, or TIA/CVA symptoms.   Blood pressure at home sometimes labile but overall well controlled per the patient.    ROS:  General:  + fatigue, + weight gain   Cardiovascular:  Denies CP, PND, syncope, near syncope, edema + palpitations.  Pulmonary:  + SALAZAR, cough, and wheezing      Vitals:    12/20/18 1148   BP: 115/74   Pulse: 70     There is no height or weight on file to calculate BMI.  PE:  General: NAD  Neck: no JVD, no carotid bruits, no TM  Heart RRR, NL S1, S2, S4 present, no rubs, murmurs, PMI normal  Lungs: CTA, bilateral wheezes at both bases left greater than right.  Respiratory effort normal  Abd: soft, non-tender, NL BS  Ext: No musculoskeletal deformities, no edema, cyanosis, or clubbing  Psych: normal mood and affect    Diagnostic Data:        ECG 12 Lead  Date/Time: 12/20/2018 11:47 AM  Performed by: Rick Luz MD  Authorized by: Rick Luz MD   Comparison: compared with previous ECG from 3/15/2018  Similar to previous ECG  Rhythm: sinus rhythm  BPM: 70              1. Paroxysmal A-fib (CMS/HCC)    2. Atypical atrial flutter (CMS/HCC)    3. Essential hypertension    4. Panlobular emphysema (CMS/HCC)    5. Tobacco abuse          Plan:    1) PAF/AFL - overall well controlled on Flecainide.   Continue present medications.   2) Anticoagulation: CHADSVasc = 1  Continue Xarelto  3) HTN- overall controlled  Wt loss, exercise, salt reduction  4) tobacco abuse - counseled cessation  5) COPD/wheezes: Encouraged him to follow-up with his lung doctor.  Encouraged him to start taking his albuterol inhaler daily.       F/up in 6 -8 months

## 2018-12-28 VITALS
DIASTOLIC BLOOD PRESSURE: 74 MMHG | WEIGHT: 230 LBS | SYSTOLIC BLOOD PRESSURE: 115 MMHG | BODY MASS INDEX: 32.93 KG/M2 | HEIGHT: 70 IN | HEART RATE: 70 BPM

## 2019-02-18 RX ORDER — DILTIAZEM HYDROCHLORIDE 240 MG/1
CAPSULE, COATED, EXTENDED RELEASE ORAL
Qty: 30 CAPSULE | Refills: 11 | Status: SHIPPED | OUTPATIENT
Start: 2019-02-18 | End: 2020-03-12

## 2019-09-19 ENCOUNTER — OFFICE VISIT (OUTPATIENT)
Dept: CARDIOLOGY | Facility: CLINIC | Age: 49
End: 2019-09-19

## 2019-09-19 VITALS
WEIGHT: 245 LBS | HEIGHT: 71 IN | BODY MASS INDEX: 34.3 KG/M2 | HEART RATE: 74 BPM | DIASTOLIC BLOOD PRESSURE: 80 MMHG | SYSTOLIC BLOOD PRESSURE: 140 MMHG

## 2019-09-19 DIAGNOSIS — Z72.0 TOBACCO ABUSE: ICD-10-CM

## 2019-09-19 DIAGNOSIS — I48.0 PAROXYSMAL A-FIB (HCC): Primary | ICD-10-CM

## 2019-09-19 DIAGNOSIS — I10 ESSENTIAL HYPERTENSION: ICD-10-CM

## 2019-09-19 PROCEDURE — 93000 ELECTROCARDIOGRAM COMPLETE: CPT | Performed by: PHYSICIAN ASSISTANT

## 2019-09-19 PROCEDURE — 99213 OFFICE O/P EST LOW 20 MIN: CPT | Performed by: INTERNAL MEDICINE

## 2019-09-19 RX ORDER — PAROXETINE HYDROCHLORIDE 20 MG/1
TABLET, FILM COATED ORAL DAILY
Refills: 0 | COMMUNITY
Start: 2019-08-28 | End: 2021-08-19 | Stop reason: ALTCHOICE

## 2019-09-19 RX ORDER — ZOLPIDEM TARTRATE 10 MG/1
10 TABLET ORAL
Refills: 0 | COMMUNITY
Start: 2019-08-28

## 2019-09-19 NOTE — PROGRESS NOTES
Bakari Rosales  1970  [phone]    09/19/2019    [facility] CARDIOLOGY     Sachin Roche PA  [address] [facility]   [place] [id number]    Chief Complaint   Patient presents with   • Atrial Fibrillation       Problem List:   1. Atrial fibrillation: CHADSvasc = 1  a. Holter monitor, 12/08/2010, with heart rates of , average 52 beats per minute with 3% PACs. No PVCs and normal sinus rhythm.   b. Echocardiogram, 12/03/2012: Normal study, EF 60%.   c. GXT, February 2013: Normal study per patient, no data.   d. Holter monitor, 07/30/2013, no data, showing apparent atrial fibrillation.  e. Transesophageal echocardiogram, August 2013, showing thrombus, Xarelto initiated, Rythmol changed to sotalol, no external cardioversion performed.   f. EKG, 08/17/2013, showed atrial fibrillation at 58 beats per minute.   g. Failed sotalol: Tikosyn initiation, 02/11/2014.   h. PV isolation procedure, 09/09/2014: Abnormal CT scan of the chest, 09/08/2014, with enlarged lymph nodes in the mediastinum.   i. 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 Event Monitor 12/2016-1/2017 showing nsr, ST, atrial flutter, stopped Rythmol and started Flecainide  2. Emphysema/chronic obstructive pulmonary disease:  a. Abnormal CT scan of the chest, 09/08/2014 with enlarged lymph nodes in the mediastinum.   b. Repeat CT scan of the chest, 11/19/2014, reactive lymph nodes in the mediastinum, predominant interstitial nodularity and linear densities and possible emphysema with early fibrosis.   c. Repeat CT scan 10/2016: There are fairly subtle peripheral interstitial changes noted primarily in the lower lobes. However there are no features of kit pulmonary fibrosis. There is no bronchiectasis, evidence of acute inflammatory process or mass  3. GXT, February 2013: Normal study per patient, no data.    4. Hypothyroidism.  5. Dyslipidemia.  6. Anxiety.  7. Tobacco abuse.  8. Hypertension.  9. Anxiety/depression    Allergies  No Known Allergies    Current Medications    Current Outpatient Medications:   •  ALPRAZolam (XANAX) 2 MG tablet, Take 2 mg by mouth 3 (three) times a day as needed., Disp: , Rfl:   •  atorvastatin (LIPITOR) 20 MG tablet, Take 20 mg by mouth Daily., Disp: , Rfl:   •  citalopram (CeleXA) 20 MG tablet, Take 20 mg by mouth Daily., Disp: , Rfl:   •  diltiaZEM CD (CARDIZEM CD) 240 MG 24 hr capsule, take 1 capsule by mouth once daily, Disp: 30 capsule, Rfl: 11  •  Ergocalciferol (VITAMIN D2 PO), Take 50,000 Units by mouth 1 (one) time per week., Disp: , Rfl:   •  fenofibrate (TRICOR) 145 MG tablet, Take 145 mg by mouth Daily., Disp: , Rfl:   •  flecainide (TAMBOCOR) 100 MG tablet, take 1 tablet by mouth twice a day, Disp: 60 tablet, Rfl: 11  •  furosemide (LASIX) 20 MG tablet, Take 20 mg by mouth daily as needed., Disp: , Rfl:   •  gemfibrozil (LOPID) 600 MG tablet, Take 600 mg by mouth 2 (two) times a day before meals., Disp: , Rfl:   •  levothyroxine (SYNTHROID, LEVOTHROID) 100 MCG tablet, Take 100 mcg by mouth., Disp: , Rfl:   •  metoprolol tartrate (LOPRESSOR) 50 MG tablet, Take 50 mg by mouth 2 (two) times a day., Disp: , Rfl:   •  pantoprazole (PROTONIX) 40 MG EC tablet, Take 1 tablet by mouth daily., Disp: 30 tablet, Rfl: 6  •  PARoxetine (PAXIL) 20 MG tablet, Daily., Disp: , Rfl: 0  •  rivaroxaban (XARELTO) 20 MG tablet, Take 20 mg by mouth daily with dinner., Disp: , Rfl:   •  zolpidem (AMBIEN) 10 MG tablet, Take 10 mg by mouth every night at bedtime., Disp: , Rfl: 0    History of Present Illness     Pt presents for follow up of atrial fibrillation,HTN, COPD. Since we last saw the pt, he has been under a lot of stress and anxiety as he lost his sister and his mom is very sick. He has had more episodes of atrial fibrillation, 10-15 minutes at a time, mild in nature, triggered by stress,  "relieved by rest. He is on Xarelto with no issues. He is trying to quit smoking and is cutting back. He has not seen his pulmologist in quite some time. He sees Dr. Milian next month and is supposed to have an echocardiogram. He was in the ER once recently for chest pain and had a normal work up. He has atypical sharp chest pains at times. He denies bleeding, or TIA/CVA symptoms. Overall feels stressed.    ROS:  General:  + fatigue, +weight gain  (+ 10-15 lbs) - loss  Cardiovascular:  Denies CP, PND, syncope, near syncope, edema + palpitations.  Pulmonary:  Denies SALAZAR, cough, or wheezing      Vitals:    09/19/19 1010   BP: 140/80   BP Location: Left arm   Patient Position: Sitting   Pulse: 74   Weight: 111 kg (245 lb)   Height: 180.3 cm (71\")     Body mass index is 34.17 kg/m².  PE:  General: NAD. A & O x 3  Neck: no JVD, no carotid bruits, no TM  Heart RRR, NL S1, S2, S4 present, no rubs, murmurs  Lungs: CTA,no , rhonchi, or rales. + scant wheezes  Abd: soft, non-tender, NL BS  Ext: No musculoskeletal deformities, no edema, cyanosis, or clubbing  Psych: normal mood and affect    Diagnostic Data:        ECG 12 Lead  Date/Time: 9/19/2019 10:24 AM  Performed by: Bhavya Locke PA  Authorized by: Bhavya Locke PA   Comparison: compared with previous ECG from 12/20/2018  Similar to previous ECG  Rhythm: sinus rhythm  BPM: 74              1. Paroxysmal A-fib (CMS/HCC)    2. Essential hypertension    3. Tobacco abuse          Plan:     1) PAF/AFL - overall well controlled on Flecainide for the most part. QRS WNL.  Continue present medications.   2) Anticoagulation: CHADSVasc = 1  Continue Xarelto  3) HTN- overall controlled  Wt loss, exercise, salt reduction  4) tobacco abuse - counseled cessation    F/up in 6 months    Electronically signed by DAYSI Patel, 09/19/19, 10:25 AM.    "

## 2019-10-25 RX ORDER — FLECAINIDE ACETATE 100 MG/1
100 TABLET ORAL 2 TIMES DAILY
Qty: 60 TABLET | Refills: 11 | Status: SHIPPED | OUTPATIENT
Start: 2019-10-25 | End: 2020-09-28 | Stop reason: SDUPTHER

## 2019-10-25 RX ORDER — METOPROLOL TARTRATE 50 MG/1
50 TABLET, FILM COATED ORAL 2 TIMES DAILY
Qty: 60 TABLET | Refills: 11 | Status: SHIPPED | OUTPATIENT
Start: 2019-10-25 | End: 2020-11-20 | Stop reason: SDUPTHER

## 2020-01-30 DIAGNOSIS — G47.33 OSA (OBSTRUCTIVE SLEEP APNEA): Primary | ICD-10-CM

## 2020-02-25 ENCOUNTER — CONSULT (OUTPATIENT)
Dept: SLEEP MEDICINE | Facility: HOSPITAL | Age: 50
End: 2020-02-25

## 2020-02-25 VITALS
SYSTOLIC BLOOD PRESSURE: 147 MMHG | HEIGHT: 71 IN | OXYGEN SATURATION: 95 % | WEIGHT: 260 LBS | BODY MASS INDEX: 36.4 KG/M2 | HEART RATE: 106 BPM | DIASTOLIC BLOOD PRESSURE: 85 MMHG

## 2020-02-25 DIAGNOSIS — E66.09 CLASS 2 OBESITY DUE TO EXCESS CALORIES WITH BODY MASS INDEX (BMI) OF 36.0 TO 36.9 IN ADULT, UNSPECIFIED WHETHER SERIOUS COMORBIDITY PRESENT: ICD-10-CM

## 2020-02-25 DIAGNOSIS — G47.33 OBSTRUCTIVE SLEEP APNEA SYNDROME: ICD-10-CM

## 2020-02-25 DIAGNOSIS — G47.10 HYPERSOMNIA: ICD-10-CM

## 2020-02-25 DIAGNOSIS — R09.02 HYPOXEMIA REQUIRING SUPPLEMENTAL OXYGEN: ICD-10-CM

## 2020-02-25 DIAGNOSIS — I48.0 PAROXYSMAL A-FIB (HCC): ICD-10-CM

## 2020-02-25 DIAGNOSIS — Z72.0 TOBACCO ABUSE: ICD-10-CM

## 2020-02-25 DIAGNOSIS — J84.9 INTERSTITIAL LUNG DISEASE (HCC): Primary | ICD-10-CM

## 2020-02-25 DIAGNOSIS — Z99.81 HYPOXEMIA REQUIRING SUPPLEMENTAL OXYGEN: ICD-10-CM

## 2020-02-25 PROCEDURE — 99204 OFFICE O/P NEW MOD 45 MIN: CPT | Performed by: INTERNAL MEDICINE

## 2020-02-25 RX ORDER — ALBUTEROL SULFATE 90 UG/1
2 AEROSOL, METERED RESPIRATORY (INHALATION) EVERY 4 HOURS PRN
Qty: 1 INHALER | Refills: 11 | Status: SHIPPED | OUTPATIENT
Start: 2020-02-25

## 2020-02-25 NOTE — PROGRESS NOTES
Bakari Rosales is a 50 y.o. male.   Chief Complaint   Patient presents with   • Sleeping Problem       HPI     50 y.o. male seen in consultation at the request of Rick Luz MD for evaluation of the above.     I have seen him in the past for very similar problems.  The last was in 2016.  He had coughing and respiratory symptoms and there was a question of interstitial lung disease on prior CAT scan.  A follow-up HRCT revealed only subtle interstitial findings not suggestive of pulmonary fibrosis.  He did not have any airway obstruction despite his symptoms.    He also was suspected of having obstructive sleep apnea and he underwent a polysomnogram though this was a sub-optimal study and a definitive diagnosis was never arrived at.    He now returns with coughing and wheezing for 8 months.  His cough is primarily dry.  He has no sinus type symptoms.  He does have reflux symptoms but this is controlled with over-the-counter Prilosec.  He does complain of some dyspnea with exertion but this is only with moderate activity and he can easily climb several flights of stairs.    He has awoken with coughing and choking at night.  He is sleepy during the day.    His wife notes snoring but no specific apneas.  She notes he wakes up choking and gasping for air at times.    He continues to see Dr. Luz for his atrial fibrillation.    Further details are as follows:    [place] Scale is: 14/24    Estimated average amount of sleep per night: 5  Number of times he wakes up at night: 2  Difficulty falling back asleep: yes  It usually takes 60 minutes to go to sleep.  He feels sleepy upon waking up: yes  Rotating or night shift work: no    Drowsiness/Sleepiness:  He exhibits the following:  excessive daytime sleepiness  excessive daytime fatigue  falls asleep watching TV  falls asleep during times of the day when he is quiet  sleepy even while on vacation    Snoring/Breathing:  He exhibits the following:  loud  snoring  snores in all sleep positions  awoken with dry mouth  awakens gasping for breath  awakens with respiratory discomfort    Reflux:  He describes the following:  takes medication for reflux    Narcolepsy:  He exhibits the following:  sudden episodes of sleep during the day    RLS/PLMs:  He describes the following:  moves or jerks during sleep  discomfort in legs with an urge to move them    Insomnia:  He describes the following:  problems initiating sleep at night  frequent awakenings  bothered by pain at night  restless sleep    Parasomnia:  He exhibits the following:  sleep talks  frequent nightmares  excessive sweating while sleeping    Weight:  Weight change in the last year:  gain: 30 lbs      The patient's relevant past medical, surgical, family, and social history reviewed and updated in Epic as appropriate.    Current medications are:   Current Outpatient Medications:   •  ALPRAZolam (XANAX) 2 MG tablet, Take 2 mg by mouth 3 (three) times a day as needed., Disp: , Rfl:   •  atorvastatin (LIPITOR) 20 MG tablet, Take 20 mg by mouth Daily., Disp: , Rfl:   •  citalopram (CeleXA) 20 MG tablet, Take 20 mg by mouth Daily., Disp: , Rfl:   •  diltiaZEM CD (CARDIZEM CD) 240 MG 24 hr capsule, take 1 capsule by mouth once daily, Disp: 30 capsule, Rfl: 11  •  fenofibrate (TRICOR) 145 MG tablet, Take 145 mg by mouth Daily., Disp: , Rfl:   •  flecainide (TAMBOCOR) 100 MG tablet, Take 1 tablet by mouth 2 (Two) Times a Day., Disp: 60 tablet, Rfl: 11  •  furosemide (LASIX) 20 MG tablet, Take 20 mg by mouth daily as needed., Disp: , Rfl:   •  levothyroxine (SYNTHROID, LEVOTHROID) 100 MCG tablet, Take 100 mcg by mouth., Disp: , Rfl:   •  metoprolol tartrate (LOPRESSOR) 50 MG tablet, Take 1 tablet by mouth 2 (Two) Times a Day., Disp: 60 tablet, Rfl: 11  •  PARoxetine (PAXIL) 20 MG tablet, Daily., Disp: , Rfl: 0  •  rivaroxaban (XARELTO) 20 MG tablet, Take 20 mg by mouth daily with dinner., Disp: , Rfl:   •  zolpidem  "(AMBIEN) 10 MG tablet, Take 10 mg by mouth every night at bedtime., Disp: , Rfl: 0  •  albuterol sulfate  (90 Base) MCG/ACT inhaler, Inhale 2 puffs Every 4 (Four) Hours As Needed for Wheezing., Disp: 1 inhaler, Rfl: 11  •  Fluticasone Furoate-Vilanterol (BREO ELLIPTA) 200-25 MCG/INH inhaler, Inhale 1 puff Daily., Disp: 1 inhaler, Rfl: 5.    Review of Systems    Review of Systems  ROS documented in patient questionnaire ×14 systems.  Reviewed with patient.  Otherwise negative except as noted in HPI.    Physical Exam    Blood pressure 147/85, pulse 106, height 180.3 cm (71\"), weight 118 kg (260 lb), SpO2 95 %. Body mass index is 36.26 kg/m².    Physical Exam   Constitutional: He is oriented to person, place, and time. He appears well-developed and well-nourished.   HENT:   Head: Normocephalic and atraumatic.   Nose: Nose normal.   Mouth/Throat: Oropharynx is clear and moist. No oropharyngeal exudate.   Class IV airway   Eyes: Conjunctivae are normal. No scleral icterus.   Neck: No tracheal deviation present. No thyromegaly present.   Cardiovascular: Normal rate and regular rhythm. Exam reveals no gallop and no friction rub.   No murmur heard.  Pulmonary/Chest: Effort normal. No respiratory distress. He has wheezes. He has no rales.   Musculoskeletal: He exhibits no edema or deformity.   Neurological: He is alert and oriented to person, place, and time.   Skin: Skin is warm and dry. No rash noted.   Psychiatric: He has a normal mood and affect. His behavior is normal.   Nursing note and vitals reviewed.      ASSESSMENT:    Problem List Items Addressed This Visit        Pulmonary Problems    Interstitial lung disease (CMS/HCC) - Primary    Overview      a.  Abnormal CTA of the chest with enlarged lymph nodes in the mediastinum   b.  Repeat CT of the chest 11/19/14: reactive lymph nodes in the mediastinum, predominant interstitial modularity and linear densities and possible          emphysema with early fibrosis. "  Referred to Dr. Li.   c.  Follow up CT of the chest 5/4/15: mild interseptal thickening of periphery of lung fields bilaterally, extending into lung bases.  Findings most suggestive         of chronic interstitial fibrotic lung disease.  Mild bronchiectatic changes centrally, extending into the lower lung fields.   PFTs: Mild restriction. Decreased diffusion. No obstruction. No significant change over priors  Chest x-ray: Interstitial prominence. No change over prior films.              Relevant Medications    Fluticasone Furoate-Vilanterol (BREO ELLIPTA) 200-25 MCG/INH inhaler    albuterol sulfate  (90 Base) MCG/ACT inhaler    Other Relevant Orders    CT Chest Hi Resolution    Full Pulmonary Function Test With Bronchodilator    Obstructive sleep apnea syndrome    Relevant Orders    Polysomnography 4 or More Parameters    Hypoxemia requiring supplemental oxygen    Overview     QHS only         Relevant Orders    CT Chest Hi Resolution    Full Pulmonary Function Test With Bronchodilator       Other    Tobacco abuse    Obesity    Hypersomnia    Relevant Orders    Polysomnography 4 or More Parameters    Paroxysmal A-fib (CMS/HCC)    Overview      a.  CHADS-VASc score = 1 for HTN, on Xarelto since Aug 2013   b.  Holter monitor 12/8/10 with heart rates , average 52 with 3% PACs.  No PVCs & normal sinus rhythm.   c. GXT, Feb 2013: Normal study per patient, no data.   d.  Holter monitor 7/30/13, no data: showing apparent atrial fibrillation per patient   e.  IBETH Aug 2013 showing thrombus.  Xarelto initiated, Rythmol changed to sotalol, no external cardioversion performed.   f.  EKG 8/17/13 showing atrial fibrillation at 58 beats per minute   g.  Failed sotalol:  Tikosyn initation 2/11/14   h.  IBETH 9/9/14:  EF 55%, LA 4.1cm, no thrombus, trace MR & TR   i.   PVA with RFA of LA roof, LA posterior wall & LA septum 9/9/14:  Abnormal CTA of chest 9/8/14, enlarged lymph nodes in mediastinum                50-year-old male seen in by me about 4 years ago with similar symptoms.  He has symptoms of a both pulmonary origin and has had sleep difficulties.  The degree of contribution of each to his symptomatology is unclear but I think he needs a similar work-up to that he received 4 years ago with attention to all the potential possibilities including obstructive airways disease including asthma or COPD, worsening interstitial lung disease, and obstructive sleep apnea    PLAN:    1. Full PFTs  2. High-resolution CAT scan of the chest  3. Empiric trial of Breo 200 at a dose of 1 inhalation daily  4. As needed albuterol  5. Polysomnogram  6. Follow-up after the above    I have reviewed the results of my evaluation and impression and discussed my recommendations in detail with the patient and his wife.    Level of Risk Moderate due to: mild exacerbation of one chronic illness, undiagnosed new problem and prescription drug management    Signed by  Corey Li MD    February 25, 2020      CC: Sachin Roche PA Tomassoni, Gery F, MD

## 2020-02-28 ENCOUNTER — OFFICE VISIT (OUTPATIENT)
Dept: PULMONOLOGY | Facility: CLINIC | Age: 50
End: 2020-02-28

## 2020-02-28 DIAGNOSIS — R09.02 HYPOXEMIA REQUIRING SUPPLEMENTAL OXYGEN: ICD-10-CM

## 2020-02-28 DIAGNOSIS — J84.9 INTERSTITIAL LUNG DISEASE (HCC): ICD-10-CM

## 2020-02-28 DIAGNOSIS — Z99.81 HYPOXEMIA REQUIRING SUPPLEMENTAL OXYGEN: ICD-10-CM

## 2020-02-28 PROCEDURE — 94726 PLETHYSMOGRAPHY LUNG VOLUMES: CPT | Performed by: INTERNAL MEDICINE

## 2020-02-28 PROCEDURE — 94375 RESPIRATORY FLOW VOLUME LOOP: CPT | Performed by: INTERNAL MEDICINE

## 2020-02-28 PROCEDURE — 94729 DIFFUSING CAPACITY: CPT | Performed by: INTERNAL MEDICINE

## 2020-03-05 ENCOUNTER — HOSPITAL ENCOUNTER (OUTPATIENT)
Dept: CT IMAGING | Facility: HOSPITAL | Age: 50
Discharge: HOME | End: 2020-03-05
Admitting: INTERNAL MEDICINE

## 2020-03-05 DIAGNOSIS — R09.02 HYPOXEMIA REQUIRING SUPPLEMENTAL OXYGEN: ICD-10-CM

## 2020-03-05 DIAGNOSIS — J84.9 INTERSTITIAL LUNG DISEASE (HCC): ICD-10-CM

## 2020-03-05 DIAGNOSIS — Z99.81 HYPOXEMIA REQUIRING SUPPLEMENTAL OXYGEN: ICD-10-CM

## 2020-03-05 PROCEDURE — 71250 CT THORAX DX C-: CPT

## 2020-03-12 RX ORDER — DILTIAZEM HYDROCHLORIDE 240 MG/1
CAPSULE, COATED, EXTENDED RELEASE ORAL
Qty: 30 CAPSULE | Refills: 5 | Status: SHIPPED | OUTPATIENT
Start: 2020-03-12 | End: 2020-09-21

## 2020-03-16 ENCOUNTER — TELEPHONE (OUTPATIENT)
Dept: PULMONOLOGY | Facility: CLINIC | Age: 50
End: 2020-03-16

## 2020-05-11 ENCOUNTER — TELEMEDICINE (OUTPATIENT)
Dept: CARDIOLOGY | Facility: CLINIC | Age: 50
End: 2020-05-11

## 2020-05-11 VITALS
WEIGHT: 245 LBS | SYSTOLIC BLOOD PRESSURE: 142 MMHG | BODY MASS INDEX: 34.3 KG/M2 | DIASTOLIC BLOOD PRESSURE: 78 MMHG | HEIGHT: 71 IN | HEART RATE: 86 BPM

## 2020-05-11 DIAGNOSIS — I48.0 PAF (PAROXYSMAL ATRIAL FIBRILLATION) (HCC): Primary | ICD-10-CM

## 2020-05-11 DIAGNOSIS — I10 ESSENTIAL HYPERTENSION: ICD-10-CM

## 2020-05-11 PROCEDURE — 99213 OFFICE O/P EST LOW 20 MIN: CPT | Performed by: INTERNAL MEDICINE

## 2020-05-11 RX ORDER — OMEPRAZOLE 20 MG/1
20 CAPSULE, DELAYED RELEASE ORAL DAILY
COMMUNITY
End: 2021-08-19 | Stop reason: ALTCHOICE

## 2020-05-11 NOTE — PROGRESS NOTES
Bakari Rosales  1970  Home phone not available    05/11/2020    [facility] CARDIOLOGY     Sachin Roche PA  [address]   [place] [id number]    Chief Complaint   Patient presents with   • Atrial Fibrillation       Problem List:   1. Atrial fibrillation: CHADSvasc = 1  a. Holter monitor, 12/08/2010, with heart rates of , average 52 beats per minute with 3% PACs. No PVCs and normal sinus rhythm.   b. Echocardiogram, 12/03/2012: Normal study, EF 60%.   c. GXT, February 2013: Normal study per patient, no data.   d. Holter monitor, 07/30/2013, no data, showing apparent atrial fibrillation.  e. Transesophageal echocardiogram, August 2013, showing thrombus, Xarelto initiated, Rythmol changed to sotalol, no external cardioversion performed.   f. EKG, 08/17/2013, showed atrial fibrillation at 58 beats per minute.   g. Failed sotalol: Tikosyn initiation, 02/11/2014.   h. PV isolation procedure, 09/09/2014: Abnormal CT scan of the chest, 09/08/2014, with enlarged lymph nodes in the mediastinum.   i. 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 Event Monitor 12/2016-1/2017 showing nsr, ST, atrial flutter, stopped Rythmol and started Flecainide  2. Emphysema/chronic obstructive pulmonary disease:  a. Abnormal CT scan of the chest, 09/08/2014 with enlarged lymph nodes in the mediastinum.   b. Repeat CT scan of the chest, 11/19/2014, reactive lymph nodes in the mediastinum, predominant interstitial nodularity and linear densities and possible emphysema with early fibrosis.   c. Repeat CT scan 10/2016: There are fairly subtle peripheral interstitial changes noted primarily in the lower lobes. However there are no features of kit pulmonary fibrosis. There is no bronchiectasis, evidence of acute inflammatory process or mass  3. GXT, February 2013: Normal study per patient, no data.    4. Hypothyroidism.  5. Dyslipidemia.  6. Anxiety.  7. Tobacco abuse.  8. Hypertension.  9. Anxiety/depression     Allergies  Allergies   Allergen Reactions   • Codeine Nausea Only       Current Medications    Current Outpatient Medications:   •  albuterol sulfate  (90 Base) MCG/ACT inhaler, Inhale 2 puffs Every 4 (Four) Hours As Needed for Wheezing., Disp: 1 inhaler, Rfl: 11  •  ALPRAZolam (XANAX) 2 MG tablet, Take 2 mg by mouth 3 (three) times a day as needed., Disp: , Rfl:   •  atorvastatin (LIPITOR) 20 MG tablet, Take 20 mg by mouth Daily., Disp: , Rfl:   •  citalopram (CeleXA) 20 MG tablet, Take 20 mg by mouth Daily., Disp: , Rfl:   •  dilTIAZem CD (CARDIZEM CD) 240 MG 24 hr capsule, take 1 capsule by mouth once daily, Disp: 30 capsule, Rfl: 5  •  fenofibrate (TRICOR) 145 MG tablet, Take 145 mg by mouth Daily., Disp: , Rfl:   •  flecainide (TAMBOCOR) 100 MG tablet, Take 1 tablet by mouth 2 (Two) Times a Day., Disp: 60 tablet, Rfl: 11  •  Fluticasone Furoate-Vilanterol (BREO ELLIPTA) 200-25 MCG/INH inhaler, Inhale 1 puff Daily., Disp: 1 inhaler, Rfl: 5  •  furosemide (LASIX) 20 MG tablet, Take 20 mg by mouth daily as needed., Disp: , Rfl:   •  levothyroxine (SYNTHROID, LEVOTHROID) 100 MCG tablet, Take 100 mcg by mouth Daily., Disp: , Rfl:   •  metoprolol tartrate (LOPRESSOR) 50 MG tablet, Take 1 tablet by mouth 2 (Two) Times a Day., Disp: 60 tablet, Rfl: 11  •  omeprazole (priLOSEC) 20 MG capsule, Take 20 mg by mouth Daily., Disp: , Rfl:   •  PARoxetine (PAXIL) 20 MG tablet, Daily., Disp: , Rfl: 0  •  rivaroxaban (XARELTO) 20 MG tablet, Take 20 mg by mouth daily with dinner., Disp: , Rfl:   •  zolpidem (AMBIEN) 10 MG tablet, Take 10 mg by mouth every night at bedtime., Disp: , Rfl: 0    History of Present Illness     Pt presents for follow up of AF/HTN. Since we last saw the pt, pt continues to have short-lived episodes of atrial fibrillation every now and then lasting mainly minutes in duration.  He did  "see a pulmonologist since her last saw him and his breathing has improved considerably on his new medications.  He has had no hospitalizations or ER visits.  No bleeding on the Xarelto.  No near syncope or syncope.,  No TIA/CVA symptoms. Overall feels well except for mild fatigue.  He has lost approximately 15 pounds and would like to lose an additional 15 pounds.  Blood pressure at home is been running approximately 130/70 with heart rates generally 70 to 80 bpm range    ROS:  General:  + fatigue, + 15 pound voluntary weight loss  Cardiovascular:  Denies CP, PND, syncope, near syncope, edema + palpitations.  Pulmonary:  + mild SALAZAR, No cough, or wheezing      Vitals:    05/11/20 1455   BP: 142/78   BP Location: Left arm   Patient Position: Sitting   Pulse: 86   Weight: 111 kg (245 lb)   Height: 180.3 cm (71\")     Body mass index is 34.17 kg/m².    Diagnostic Data:      Procedures    1. PAF (paroxysmal atrial fibrillation) (CMS/[facility])    2. Essential hypertension          Plan:     1) PAF/AFL -overall doing reasonably well on flecainide.  Does have short episodes of breakthrough but he tolerates them very well.  Does not want more aggressive management at this time.  Continue present medications.   2) Anticoagulation: CHADSVasc = 1  Continue Xarelto  3) HTN- overall controlled  Wt loss, exercise, salt reduction  4) COPD: Follow-up with pulmonology    This was an audio and video enabled telemedicine encounter.This patient has consented to a telehealth visit via [email or web address]. The visit was scheduled as a telehealth visit to comply with patient safety concerns in accordance with CDC recommendations.  All vitals recorded within this visit are reported by the patient.  I spent  12 minutes in total including but not limited to the 5 minutes spent in direct conversation with this patient.     F/up in 6 months in [facility]      "

## 2020-09-21 RX ORDER — DILTIAZEM HYDROCHLORIDE 240 MG/1
CAPSULE, COATED, EXTENDED RELEASE ORAL
Qty: 30 CAPSULE | Refills: 6 | Status: SHIPPED | OUTPATIENT
Start: 2020-09-21 | End: 2021-04-08 | Stop reason: SDUPTHER

## 2020-09-28 RX ORDER — FLECAINIDE ACETATE 100 MG/1
100 TABLET ORAL 2 TIMES DAILY
Qty: 60 TABLET | Refills: 6 | Status: SHIPPED | OUTPATIENT
Start: 2020-09-28 | End: 2021-04-21 | Stop reason: SDUPTHER

## 2020-11-20 RX ORDER — METOPROLOL TARTRATE 50 MG/1
50 TABLET, FILM COATED ORAL 2 TIMES DAILY
Qty: 60 TABLET | Refills: 11 | Status: SHIPPED | OUTPATIENT
Start: 2020-11-20

## 2020-12-17 ENCOUNTER — OFFICE VISIT (OUTPATIENT)
Dept: CARDIOLOGY | Facility: CLINIC | Age: 50
End: 2020-12-17

## 2020-12-17 VITALS
OXYGEN SATURATION: 96 % | WEIGHT: 238 LBS | BODY MASS INDEX: 34.07 KG/M2 | RESPIRATION RATE: 18 BRPM | TEMPERATURE: 98.2 F | DIASTOLIC BLOOD PRESSURE: 68 MMHG | HEIGHT: 70 IN | SYSTOLIC BLOOD PRESSURE: 140 MMHG | HEART RATE: 74 BPM

## 2020-12-17 DIAGNOSIS — Z72.0 TOBACCO ABUSE: ICD-10-CM

## 2020-12-17 DIAGNOSIS — I48.0 PAROXYSMAL A-FIB (HCC): Primary | ICD-10-CM

## 2020-12-17 DIAGNOSIS — I10 ESSENTIAL HYPERTENSION: ICD-10-CM

## 2020-12-17 PROCEDURE — 99213 OFFICE O/P EST LOW 20 MIN: CPT | Performed by: PHYSICIAN ASSISTANT

## 2020-12-17 NOTE — PROGRESS NOTES
Bakari Rsoales  1970  Home phone not available    12/17/2020    [facility] CARDIOLOGY     Referring Provider: No ref. provider found     Sachin Roche, PA  [address]   [place] [address]    Chief Complaint   Patient presents with   • Atrial Fibrillation     Problem List:   1. Atrial fibrillation: CHADSvasc = 1  a. Holter monitor, 12/08/2010, with heart rates of , average 52 beats per minute with 3% PACs. No PVCs and normal sinus rhythm.   b. Echocardiogram, 12/03/2012: Normal study, EF 60%.   c. GXT, February 2013: Normal study per patient, no data.   d. Holter monitor, 07/30/2013, no data, showing apparent atrial fibrillation.  e. Transesophageal echocardiogram, August 2013, showing thrombus, Xarelto initiated, Rythmol changed to sotalol, no external cardioversion performed.   f. EKG, 08/17/2013, showed atrial fibrillation at 58 beats per minute.   g. Failed sotalol: Tikosyn initiation, 02/11/2014.   h. PV isolation procedure, 09/09/2014: Abnormal CT scan of the chest, 09/08/2014, with enlarged lymph nodes in the mediastinum.   i. 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 Event Monitor 12/2016-1/2017 showing nsr, ST, atrial flutter, stopped Rythmol and started Flecainide  k. Echocardiogram 12/5/2019: EF 60-65%, no significant valve disease.   2. Emphysema/chronic obstructive pulmonary disease:  a. Abnormal CT scan of the chest, 09/08/2014 with enlarged lymph nodes in the mediastinum.   b. Repeat CT scan of the chest, 11/19/2014, reactive lymph nodes in the mediastinum, predominant interstitial nodularity and linear densities and possible emphysema with early fibrosis.   c. Repeat CT scan 10/2016: There are fairly subtle peripheral interstitial changes noted primarily in the lower lobes. However there are no features of kit pulmonary fibrosis. There is no bronchiectasis, evidence of acute inflammatory  process or mass  3. GXT, February 2013: Normal study per patient, no data.   4. Hypothyroidism.  5. Dyslipidemia.  6. Anxiety.  7. Tobacco abuse.  8. Hypertension.  9. Anxiety/depression    Allergies  Allergies   Allergen Reactions   • Codeine Nausea Only       Current Medications    Current Outpatient Medications:   •  albuterol sulfate  (90 Base) MCG/ACT inhaler, Inhale 2 puffs Every 4 (Four) Hours As Needed for Wheezing., Disp: 1 inhaler, Rfl: 11  •  ALPRAZolam (XANAX) 2 MG tablet, Take 2 mg by mouth 3 (three) times a day as needed., Disp: , Rfl:   •  atorvastatin (LIPITOR) 20 MG tablet, Take 20 mg by mouth Daily., Disp: , Rfl:   •  citalopram (CeleXA) 20 MG tablet, Take 20 mg by mouth Daily., Disp: , Rfl:   •  dilTIAZem CD (CARDIZEM CD) 240 MG 24 hr capsule, TAKE 1 CAPSULE BY MOUTH ONCE DAILY., Disp: 30 capsule, Rfl: 6  •  fenofibrate (TRICOR) 145 MG tablet, Take 145 mg by mouth Daily., Disp: , Rfl:   •  flecainide (TAMBOCOR) 100 MG tablet, Take 1 tablet by mouth 2 (Two) Times a Day., Disp: 60 tablet, Rfl: 6  •  Fluticasone Furoate-Vilanterol (BREO ELLIPTA) 200-25 MCG/INH inhaler, Inhale 1 puff Daily., Disp: 1 inhaler, Rfl: 5  •  furosemide (LASIX) 20 MG tablet, Take 20 mg by mouth daily as needed., Disp: , Rfl:   •  levothyroxine (SYNTHROID, LEVOTHROID) 100 MCG tablet, Take 100 mcg by mouth Daily., Disp: , Rfl:   •  metoprolol tartrate (LOPRESSOR) 50 MG tablet, Take 1 tablet by mouth 2 (Two) Times a Day., Disp: 60 tablet, Rfl: 11  •  omeprazole (priLOSEC) 20 MG capsule, Take 20 mg by mouth Daily., Disp: , Rfl:   •  PARoxetine (PAXIL) 20 MG tablet, Daily., Disp: , Rfl: 0  •  rivaroxaban (XARELTO) 20 MG tablet, Take 20 mg by mouth daily with dinner., Disp: , Rfl:   •  zolpidem (AMBIEN) 10 MG tablet, Take 10 mg by mouth every night at bedtime., Disp: , Rfl: 0    History of Present Illness:     Pt presents for follow up of atrial fibrillation, atrial flutter, HTN, and tobacco abuse. Since we last saw the pt,  "he continues to have episodes of atrial fibrillation, just occasionally.  He denies SOB, CP, LH, and dizziness, syncope. Denies any hospitalizations, ER visits, bleeding, or TIA/CVA symptoms. Overall feels well. He has lost 30 lbs with diet changes, no soda, no processed sugar. He feels much better. He is trying to quit cigarettes. He has been walking a mile per day and feels good when he does. He still follows with Dr. Milian and had recent echocardiogram which he reports was normal. bP is good at home.     ROS:  General:  Denies fatigue, weight gain + loss (30 lb loss)  Cardiovascular:  Denies CP, PND, syncope, near syncope, edema or palpitations.  Pulmonary:  Denies SALAZAR, cough, or wheezing      Vitals:    12/17/20 1010   BP: 140/68   BP Location: Left arm   Patient Position: Sitting   Pulse: 74   Resp: 18   Temp: 98.2 °F (36.8 °C)   SpO2: 96%   Weight: 108 kg (238 lb)   Height: 177.8 cm (70\")     Body mass index is 34.15 kg/m².  PE:  General: NAD. A & O x 3   Neck: no JVD, no carotid bruits, no TM  Heart RRR, NL S1, S2, S4 present, no rubs, murmurs  Lungs: CTA, + wheezes,-  rhonchi, or rales  Abd: soft, non-tender, NL BS  Ext: No musculoskeletal deformities, no edema, cyanosis, or clubbing  Psych: normal mood and affect    Diagnostic Data:      Procedures    1. Paroxysmal A-fib (CMS/HCC)    2. Essential hypertension    3. Tobacco abuse          Plan:     1) PAF/AFL:  - overall well controlled on Flecainide for the most part. Occasionally takes an extra Flecainide for episodes. QRS WNL on EKG.   Continue present medications.   2) Anticoagulation: CHADSVasc = 1  Continue Xarelto  3) HTN- overall controlled  Wt loss, exercise, salt reduction  4) tobacco abuse - counseled cessation      F/up in 6-8  months    Electronically signed by DAYSI Patel, 12/17/20, 10:12 AM EST.    "

## 2021-04-08 RX ORDER — DILTIAZEM HYDROCHLORIDE 240 MG/1
240 CAPSULE, COATED, EXTENDED RELEASE ORAL DAILY
Qty: 30 CAPSULE | Refills: 6 | Status: SHIPPED | OUTPATIENT
Start: 2021-04-08

## 2021-04-21 RX ORDER — FLECAINIDE ACETATE 100 MG/1
100 TABLET ORAL 2 TIMES DAILY
Qty: 60 TABLET | Refills: 6 | Status: SHIPPED | OUTPATIENT
Start: 2021-04-21

## 2021-05-20 ENCOUNTER — TELEPHONE (OUTPATIENT)
Dept: CARDIOLOGY | Facility: CLINIC | Age: 51
End: 2021-05-20

## 2021-05-20 NOTE — TELEPHONE ENCOUNTER
Pt called he stated that he is having problems getting his rhythm medications and blood thinner from his pharmacy [facility] in [place]. I called and spoke with the pharmacist and his Xarelto has been filled waiting on him and he just filled his flecainide on 5/17/21 and his Diltiazem on 5/10/21. I tried to call the pt back no answer I left a voicemail.

## 2021-08-19 ENCOUNTER — OFFICE VISIT (OUTPATIENT)
Dept: CARDIOLOGY | Facility: CLINIC | Age: 51
End: 2021-08-19

## 2021-08-19 VITALS
HEART RATE: 76 BPM | BODY MASS INDEX: 31.35 KG/M2 | OXYGEN SATURATION: 98 % | HEIGHT: 70 IN | SYSTOLIC BLOOD PRESSURE: 138 MMHG | WEIGHT: 219 LBS | DIASTOLIC BLOOD PRESSURE: 74 MMHG

## 2021-08-19 DIAGNOSIS — J43.1 PANLOBULAR EMPHYSEMA (HCC): ICD-10-CM

## 2021-08-19 DIAGNOSIS — I10 ESSENTIAL HYPERTENSION: ICD-10-CM

## 2021-08-19 DIAGNOSIS — I49.1 PREMATURE ATRIAL CONTRACTIONS: ICD-10-CM

## 2021-08-19 DIAGNOSIS — Z72.0 TOBACCO ABUSE: ICD-10-CM

## 2021-08-19 DIAGNOSIS — I48.0 PAROXYSMAL A-FIB (HCC): Primary | ICD-10-CM

## 2021-08-19 DIAGNOSIS — J84.9 INTERSTITIAL LUNG DISEASE (HCC): ICD-10-CM

## 2021-08-19 PROCEDURE — 99213 OFFICE O/P EST LOW 20 MIN: CPT | Performed by: INTERNAL MEDICINE

## 2021-08-19 PROCEDURE — 93000 ELECTROCARDIOGRAM COMPLETE: CPT | Performed by: INTERNAL MEDICINE

## 2021-08-19 NOTE — PROGRESS NOTES
Bakari Rosales  1970  Home phone not available    08/19/2021    [facility] CARDIOLOGY     Referring Provider: No ref. provider found     Sachin Roche, PA  [address]   [place] [address]    Chief Complaint   Patient presents with   • Atrial Fibrillation       Problem List:   1. Atrial fibrillation: CHADSvasc = 1  a. Holter monitor, 12/08/2010, with heart rates of , average 52 beats per minute with 3% PACs. No PVCs and normal sinus rhythm.   b. Echocardiogram, 12/03/2012: Normal study, EF 60%.   c. GXT, February 2013: Normal study per patient, no data.   d. Holter monitor, 07/30/2013, no data, showing apparent atrial fibrillation.  e. Transesophageal echocardiogram, August 2013, showing thrombus, Xarelto initiated, Rythmol changed to sotalol, no external cardioversion performed.   f. EKG, 08/17/2013, showed atrial fibrillation at 58 beats per minute.   g. Failed sotalol: Tikosyn initiation, 02/11/2014.   h. PV isolation procedure, 09/09/2014: Abnormal CT scan of the chest, 09/08/2014, with enlarged lymph nodes in the mediastinum.   i. 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 Event Monitor 12/2016-1/2017 showing nsr, ST, atrial flutter, stopped Rythmol and started Flecainide  k. Echocardiogram 12/5/2019: EF 60-65%, no significant valve disease.   2. Emphysema/chronic obstructive pulmonary disease:  a. Abnormal CT scan of the chest, 09/08/2014 with enlarged lymph nodes in the mediastinum.   b. Repeat CT scan of the chest, 11/19/2014, reactive lymph nodes in the mediastinum, predominant interstitial nodularity and linear densities and possible emphysema with early fibrosis.   c. Repeat CT scan 10/2016: There are fairly subtle peripheral interstitial changes noted primarily in the lower lobes. However there are no features of kit pulmonary fibrosis. There is no bronchiectasis, evidence of acute inflammatory process or  mass  3. GXT, 2013: Normal study per patient, no data.   4. Hypothyroidism.  5. Dyslipidemia.  6. Anxiety.  7. Tobacco abuse.  8. Hypertension.  9. Anxiety/depression      Allergies  Allergies   Allergen Reactions   • Codeine Nausea Only       Current Medications    Current Outpatient Medications:   •  albuterol sulfate  (90 Base) MCG/ACT inhaler, Inhale 2 puffs Every 4 (Four) Hours As Needed for Wheezing., Disp: 1 inhaler, Rfl: 11  •  ALPRAZolam (XANAX) 2 MG tablet, Take 2 mg by mouth 3 (three) times a day as needed., Disp: , Rfl:   •  dilTIAZem CD (CARDIZEM CD) 240 MG 24 hr capsule, Take 1 capsule by mouth Daily., Disp: 30 capsule, Rfl: 6  •  fenofibrate (TRICOR) 145 MG tablet, Take 145 mg by mouth Daily., Disp: , Rfl:   •  flecainide (TAMBOCOR) 100 MG tablet, Take 1 tablet by mouth 2 (Two) Times a Day., Disp: 60 tablet, Rfl: 6  •  Fluticasone Furoate-Vilanterol (BREO ELLIPTA) 200-25 MCG/INH inhaler, Inhale 1 puff Daily., Disp: 1 inhaler, Rfl: 5  •  furosemide (LASIX) 20 MG tablet, Take 20 mg by mouth daily as needed., Disp: , Rfl:   •  levothyroxine (SYNTHROID, LEVOTHROID) 100 MCG tablet, Take 100 mcg by mouth Daily., Disp: , Rfl:   •  metoprolol tartrate (LOPRESSOR) 50 MG tablet, Take 1 tablet by mouth 2 (Two) Times a Day., Disp: 60 tablet, Rfl: 11  •  Nicotine Polacrilex (RA NICOTINE GUM MT), Apply  to the mouth or throat. 1 PATCH DAILY, Disp: , Rfl:   •  rivaroxaban (XARELTO) 20 MG tablet, Take 20 mg by mouth daily with dinner., Disp: , Rfl:   •  zolpidem (AMBIEN) 10 MG tablet, Take 10 mg by mouth every night at bedtime., Disp: , Rfl: 0    History of Present Illness     Pt presents for follow up of AF/HTN. Since we last saw the pt, patient's brother who is 59 years of age  suddenly of a heart attack and sudden cardiac death.  Since that time he stopped smoking.  He has lost approximately 40 pounds.  He is also change his diet to reduce his overall cholesterol intake.  He does have some  "short-lived palpitations but for the most part atrial fibrillation episodes of been well controlled on current medical regimen.  He does have chronic dyspnea on exertion due to his emphysema and is waiting to see his pulmonologist.   Denies any hospitalizations, ER visits, bleeding, or TIA/CVA symptoms. Overall feels better after weight loss.  Blood pressures have been stable at home.    ROS:  General:  Denies fatigue, + voluntary weight loss   Cardiovascular:  Denies CP, PND, syncope, near syncope, edema or palpitations.  Pulmonary:  + SALAZAR, cough,  wheezing      Vitals:    08/19/21 1238   BP: 138/74   BP Location: Left arm   Patient Position: Sitting   Pulse: 76   SpO2: 98%   Weight: 99.3 kg (219 lb)   Height: 177.8 cm (70\")     Body mass index is 31.42 kg/m².  PE:  General: NAD  Neck: no JVD, no carotid bruits, no TM  Heart RRR, NL S1, S2, S4 present, no rubs, murmurs  Lungs: CTA, no wheezes, rhonchi, or rales  Abd: soft, non-tender, NL BS  Ext: No musculoskeletal deformities, no edema, cyanosis, or clubbing  Psych: normal mood and affect    Diagnostic Data:        ECG 12 Lead    Date/Time: 8/19/2021 12:48 PM  Performed by: Rick Luz MD  Authorized by: Rick Luz MD   Comparison: compared with previous ECG from 12/18/2020  Similar to previous ECG  Rhythm: sinus rhythm  BPM: 76              1. Paroxysmal A-fib (CMS/HCC)    2. Premature atrial contractions    3. Essential hypertension    4. Panlobular emphysema (CMS/HCC)    5. Interstitial lung disease (CMS/HCC)    6. Tobacco abuse          Plan:  1) PAF/AFL:  - overall well controlled on Flecainide for the most part. Occasionally takes an extra Flecainide for episodes. QRS WNL on EKG.   Continue present medications.   2) Anticoagulation: CHADSVasc = 1  Continue Xarelto  3) HTN- overall controlled  Wt loss, exercise, salt reduction  4) tobacco abuse -stopped recently    F/up in 6 months      "

## 2022-05-19 ENCOUNTER — OFFICE VISIT (OUTPATIENT)
Dept: CARDIOLOGY | Facility: CLINIC | Age: 52
End: 2022-05-19

## 2022-05-19 VITALS
DIASTOLIC BLOOD PRESSURE: 92 MMHG | HEART RATE: 56 BPM | OXYGEN SATURATION: 98 % | HEIGHT: 70 IN | BODY MASS INDEX: 31.92 KG/M2 | WEIGHT: 223 LBS | SYSTOLIC BLOOD PRESSURE: 150 MMHG

## 2022-05-19 DIAGNOSIS — I10 PRIMARY HYPERTENSION: ICD-10-CM

## 2022-05-19 DIAGNOSIS — I48.0 PAROXYSMAL A-FIB: Primary | ICD-10-CM

## 2022-05-19 PROCEDURE — 99213 OFFICE O/P EST LOW 20 MIN: CPT | Performed by: PHYSICIAN ASSISTANT

## 2022-05-19 PROCEDURE — 93000 ELECTROCARDIOGRAM COMPLETE: CPT | Performed by: PHYSICIAN ASSISTANT

## 2022-05-19 RX ORDER — AMLODIPINE BESYLATE 5 MG/1
5 TABLET ORAL DAILY
Qty: 30 TABLET | Refills: 11 | Status: SHIPPED | OUTPATIENT
Start: 2022-05-19

## 2022-05-19 RX ORDER — ATORVASTATIN CALCIUM 20 MG/1
20 TABLET, FILM COATED ORAL DAILY
COMMUNITY
Start: 2022-05-13

## 2022-05-19 NOTE — PROGRESS NOTES
Bakari Marionmons  1970  [phone]    05/19/2022    [facility] CARDIOLOGY     Referring Provider: No ref. provider found     Sachin Roche PA  [address]   [place] [id number]    Chief Complaint   Patient presents with   • Paroxysmal A-fib (CMS/HCC)         Problem List:   1. Atrial fibrillation: CHADSvasc = 1  a. Holter monitor, 12/08/2010, with heart rates of , average 52 beats per minute with 3% PACs. No PVCs and normal sinus rhythm.   b. Echocardiogram, 12/03/2012: Normal study, EF 60%.   c. GXT, February 2013: Normal study per patient, no data.   d. Holter monitor, 07/30/2013, no data, showing apparent atrial fibrillation.  e. Transesophageal echocardiogram, August 2013, showing thrombus, Xarelto initiated, Rythmol changed to sotalol, no external cardioversion performed.   f. EKG, 08/17/2013, showed atrial fibrillation at 58 beats per minute.   g. Failed sotalol: Tikosyn initiation, 02/11/2014.   h. PV isolation procedure, 09/09/2014: Abnormal CT scan of the chest, 09/08/2014, with enlarged lymph nodes in the mediastinum.   i. 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 Event Monitor 12/2016-1/2017 showing nsr, ST, atrial flutter, stopped Rythmol and started Flecainide  k. Echocardiogram 12/5/2019: EF 60-65%, no significant valve disease.   2. Emphysema/chronic obstructive pulmonary disease:  a. Abnormal CT scan of the chest, 09/08/2014 with enlarged lymph nodes in the mediastinum.   b. Repeat CT scan of the chest, 11/19/2014, reactive lymph nodes in the mediastinum, predominant interstitial nodularity and linear densities and possible emphysema with early fibrosis.   c. Repeat CT scan 10/2016: There are fairly subtle peripheral interstitial changes noted primarily in the lower lobes. However there are no features of kit pulmonary fibrosis. There is no bronchiectasis, evidence of acute inflammatory process or  mass  3. GXT, February 2013: Normal study per patient, no data.   4. Hypothyroidism.  5. Dyslipidemia.  6. Anxiety.  7. Tobacco abuse.  8. Hypertension.  9. Anxiety/depression      Allergies  Allergies   Allergen Reactions   • Codeine Nausea Only       Current Medications    Current Outpatient Medications:   •  albuterol sulfate  (90 Base) MCG/ACT inhaler, Inhale 2 puffs Every 4 (Four) Hours As Needed for Wheezing., Disp: 1 inhaler, Rfl: 11  •  ALPRAZolam (XANAX) 2 MG tablet, Take 2 mg by mouth 3 (three) times a day as needed., Disp: , Rfl:   •  atorvastatin (LIPITOR) 20 MG tablet, Take 20 mg by mouth Daily., Disp: , Rfl:   •  dilTIAZem CD (CARDIZEM CD) 240 MG 24 hr capsule, Take 1 capsule by mouth Daily., Disp: 30 capsule, Rfl: 6  •  fenofibrate (TRICOR) 145 MG tablet, Take 145 mg by mouth Daily., Disp: , Rfl:   •  flecainide (TAMBOCOR) 100 MG tablet, Take 1 tablet by mouth 2 (Two) Times a Day., Disp: 60 tablet, Rfl: 6  •  Fluticasone Furoate-Vilanterol (BREO ELLIPTA) 200-25 MCG/INH inhaler, Inhale 1 puff Daily., Disp: 1 inhaler, Rfl: 5  •  furosemide (LASIX) 20 MG tablet, Take 20 mg by mouth daily as needed., Disp: , Rfl:   •  levothyroxine (SYNTHROID, LEVOTHROID) 100 MCG tablet, Take 100 mcg by mouth Daily., Disp: , Rfl:   •  metoprolol tartrate (LOPRESSOR) 50 MG tablet, Take 1 tablet by mouth 2 (Two) Times a Day., Disp: 60 tablet, Rfl: 11  •  Nicotine Polacrilex (RA NICOTINE GUM MT), Apply  to the mouth or throat. 1 PATCH DAILY, Disp: , Rfl:   •  rivaroxaban (XARELTO) 20 MG tablet, Take 20 mg by mouth daily with dinner., Disp: , Rfl:   •  zolpidem (AMBIEN) 10 MG tablet, Take 10 mg by mouth every night at bedtime., Disp: , Rfl: 0  •  amLODIPine (NORVASC) 5 MG tablet, Take 1 tablet by mouth Daily., Disp: 30 tablet, Rfl: 11    History of Present Illness     Pt presents for follow up of atrial fibrillation, Htn  . Since we last saw the pt, pt denies any significant  AF episodes on Flecainide. He has  "occasional SOB which he attributes to his COPD. He has been experiencing atypical CP and had a recent normal stress test with DR. Milian. He denies, LH, and dizziness. He also complains of joint stiffness/tenderness in all joints, but most notably in his hands. He cannot close his hands in a fist due to pain. He denies swelling of his hands. He also states his fingernail beds are turning blue. O2 sats are 93-95% on RA.  Denies any hospitalizations, ER visits, bleeding issues on XArelto, or TIA/CVA symptoms. He has quit smoking. His BP is not well controlled and is mostly in the 150s systolic at home.     ROS:  General:  Denies fatigue, weight gain or loss  Cardiovascular:  + CP, - PND, syncope, near syncope, edema or palpitations.  Pulmonary: + SALAZAR, - cough, or wheezing      Vitals:    05/19/22 1152   BP: 150/92   BP Location: Left arm   Patient Position: Sitting   Pulse: 56   SpO2: 98%   Weight: 101 kg (223 lb)   Height: 177.8 cm (70\")     Body mass index is 32 kg/m².  PE:  General: NAD. A & O x 3   Neck: no JVD, no carotid bruits, no TM  Heart RRR, NL S1, S2, S4 present, no rubs, murmurs  Lungs: CTA, no wheezes, rhonchi, or rales  Abd: soft, non-tender, NL BS  Ext: No musculoskeletal deformities, no edema, cyanosis, or clubbing  Psych: normal mood and affect    Diagnostic Data:        ECG 12 Lead    Date/Time: 5/19/2022 12:33 PM  Performed by: Bhavya Locke PA  Authorized by: Bhavya Locke PA   Comparison: compared with previous ECG from 8/19/2021  Similar to previous ECG  Rhythm: sinus rhythm  BPM: 56              1. Paroxysmal A-fib (HCC)    2. Primary hypertension          Plan:  1) PAF/AFL:  - overall well controlled on Flecainide for the most part. QRS WNL on EKG.   - order echocardiogram   Continue present medications.   2) Anticoagulation: CHADSVasc = 1  Continue Xarelto  3) HTN- not well controlled. Add Norvasc 5 mg daily.   Wt loss, exercise, salt reduction  5) Joint Pain:  - follow up with PCP "     F/up in 6 months    Electronically signed by DAYSI Patel, 05/19/22, 12:35 PM EDT.

## 2023-02-14 ENCOUNTER — TELEPHONE (OUTPATIENT)
Dept: CARDIOLOGY | Facility: CLINIC | Age: 53
End: 2023-02-14
Payer: MEDICARE

## 2023-02-14 NOTE — TELEPHONE ENCOUNTER
I tried to call the patient to see if he had his ECHO done that Dr. Luz ordered at his last visit in May. He did not answer. I left a message for him to call me back at the office.

## 2023-02-16 ENCOUNTER — OFFICE VISIT (OUTPATIENT)
Dept: CARDIOLOGY | Facility: CLINIC | Age: 53
End: 2023-02-16
Payer: MEDICARE

## 2023-02-16 VITALS
BODY MASS INDEX: 31.5 KG/M2 | OXYGEN SATURATION: 98 % | WEIGHT: 220 LBS | HEART RATE: 61 BPM | SYSTOLIC BLOOD PRESSURE: 132 MMHG | DIASTOLIC BLOOD PRESSURE: 84 MMHG | HEIGHT: 70 IN

## 2023-02-16 DIAGNOSIS — I48.0 PAROXYSMAL A-FIB: Primary | ICD-10-CM

## 2023-02-16 DIAGNOSIS — I10 PRIMARY HYPERTENSION: ICD-10-CM

## 2023-02-16 DIAGNOSIS — I48.4 ATYPICAL ATRIAL FLUTTER: ICD-10-CM

## 2023-02-16 PROCEDURE — 99213 OFFICE O/P EST LOW 20 MIN: CPT

## 2023-02-16 PROCEDURE — 93000 ELECTROCARDIOGRAM COMPLETE: CPT

## 2023-02-16 NOTE — PROGRESS NOTES
Bakari Rosales  1970  [phone]    02/16/2023    [facility] CARDIOLOGY [place]     Referring Provider: No ref. provider found     Sachin Roche PA  [address]   [place] [id number]    Chief Complaint   Patient presents with   • Atrial Fibrillation       Problem List:   1. Atrial fibrillation: CHADSvasc = 1  a. Holter monitor, 12/08/2010, with heart rates of , average 52 beats per minute with 3% PACs. No PVCs and normal sinus rhythm.   b. Echocardiogram, 12/03/2012: Normal study, EF 60%.   c. GXT, February 2013: Normal study per patient, no data.   d. Holter monitor, 07/30/2013, no data, showing apparent atrial fibrillation.  e. Transesophageal echocardiogram, August 2013, showing thrombus, Xarelto initiated, Rythmol changed to sotalol, no external cardioversion performed.   f. EKG, 08/17/2013, showed atrial fibrillation at 58 beats per minute.   g. Failed sotalol: Tikosyn initiation, 02/11/2014.   h. PV isolation procedure, 09/09/2014: Abnormal CT scan of the chest, 09/08/2014, with enlarged lymph nodes in the mediastinum.   i. 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 Event Monitor 12/2016-1/2017 showing nsr, ST, atrial flutter, stopped Rythmol and started Flecainide  k. Echocardiogram 12/5/2019: EF 60-65%, no significant valve disease.   2. Emphysema/chronic obstructive pulmonary disease:  a. Abnormal CT scan of the chest, 09/08/2014 with enlarged lymph nodes in the mediastinum.   b. Repeat CT scan of the chest, 11/19/2014, reactive lymph nodes in the mediastinum, predominant interstitial nodularity and linear densities and possible emphysema with early fibrosis.   c. Repeat CT scan 10/2016: There are fairly subtle peripheral interstitial changes noted primarily in the lower lobes. However there are no features of kit pulmonary fibrosis. There is no bronchiectasis, evidence of acute inflammatory process or  mass  3. GXT, February 2013: Normal study per patient, no data.   4. Hypothyroidism.  5. Dyslipidemia.  6. Anxiety.  7. Tobacco abuse.  8. Hypertension.  9. Anxiety/depression    Allergies  Allergies   Allergen Reactions   • Codeine Nausea Only       Current Medications    Current Outpatient Medications:   •  albuterol sulfate  (90 Base) MCG/ACT inhaler, Inhale 2 puffs Every 4 (Four) Hours As Needed for Wheezing., Disp: 1 inhaler, Rfl: 11  •  ALPRAZolam (XANAX) 2 MG tablet, Take 2 mg by mouth 3 (three) times a day as needed., Disp: , Rfl:   •  amLODIPine (NORVASC) 5 MG tablet, Take 1 tablet by mouth Daily., Disp: 30 tablet, Rfl: 11  •  atorvastatin (LIPITOR) 20 MG tablet, Take 20 mg by mouth Daily., Disp: , Rfl:   •  dilTIAZem CD (CARDIZEM CD) 240 MG 24 hr capsule, Take 1 capsule by mouth Daily., Disp: 30 capsule, Rfl: 6  •  fenofibrate (TRICOR) 145 MG tablet, Take 145 mg by mouth Daily., Disp: , Rfl:   •  flecainide (TAMBOCOR) 100 MG tablet, Take 1 tablet by mouth 2 (Two) Times a Day., Disp: 60 tablet, Rfl: 6  •  Fluticasone Furoate-Vilanterol (BREO ELLIPTA) 200-25 MCG/INH inhaler, Inhale 1 puff Daily., Disp: 1 inhaler, Rfl: 5  •  furosemide (LASIX) 20 MG tablet, Take 20 mg by mouth daily as needed., Disp: , Rfl:   •  levothyroxine (SYNTHROID, LEVOTHROID) 100 MCG tablet, Take 100 mcg by mouth Daily., Disp: , Rfl:   •  metoprolol tartrate (LOPRESSOR) 50 MG tablet, Take 1 tablet by mouth 2 (Two) Times a Day., Disp: 60 tablet, Rfl: 11  •  rivaroxaban (XARELTO) 20 MG tablet, Take 20 mg by mouth daily with dinner., Disp: , Rfl:   •  zolpidem (AMBIEN) 10 MG tablet, Take 10 mg by mouth every night at bedtime., Disp: , Rfl: 0  •  Nicotine Polacrilex (RA NICOTINE GUM MT), Apply  to the mouth or throat. 1 PATCH DAILY, Disp: , Rfl:     History of Present Illness     Pt presents for follow up of atrial fibrillation, Htn. Since we last saw the pt, pt denies any LH, and dizziness. Rare palpitations, every few months.  "Denies SOB. Denies any hospitalizations, ER visits, bleeding, or TIA/CVA symptoms. BP is well controlled. He is following with Dr. Milian. Overall feels well.    ROS:  General:  Denies fatigue, weight gain or loss  Cardiovascular:  Denies CP, PND, syncope, near syncope, edema or palpitations.  Pulmonary:  Denies SALAZAR, cough, or wheezing       Vitals:    02/16/23 1347   BP: 132/84   BP Location: Right arm   Patient Position: Sitting   Pulse: 61   SpO2: 98%   Weight: 99.8 kg (220 lb)   Height: 177.8 cm (70\")     Body mass index is 31.57 kg/m².  PE:  General: NAD  Neck: no JVD, no carotid bruits, no TM  Heart RRR, NL S1, S2, S4 present, no rubs, murmurs  Lungs: CTA, no wheezes, rhonchi, or rales  Abd: soft, non-tender, NL BS  Ext: No musculoskeletal deformities, no edema, cyanosis, or clubbing  Psych: normal mood and affect    Diagnostic Data:       ECG 12 Lead    Date/Time: 2/16/2023 2:01 PM  Performed by: Court Candelario APRN  Authorized by: Court Candelario APRN   Comparison: compared with previous ECG from 5/19/2022  Comparison to previous ECG: Sinus rhythm with 1st degree AV block has replaced sinus bradycardia  Rhythm: sinus rhythm  Conduction: 1st degree AV block              1. Paroxysmal A-fib (HCC)    2. Atypical atrial flutter (HCC)    3. Primary hypertension          Plan:  1) PAF/AFL:  - overall well controlled on Flecainide for the most part. QRS WNL on EKG.   - needs repeat echocardiogram, he will have done at [facility], order is placed  Continue present medications.   2) Anticoagulation: CHADSVasc = 1  Continue Xarelto  3) HTN- not well controlled. Add Norvasc 5 mg daily.   Wt loss, exercise, salt reduction  5) Joint Pain:  - follow up with PCP      F/up in 6 months    Electronically signed by HARDY Kerr, 02/16/23, 1:58 PM EST.      "

## 2023-10-19 ENCOUNTER — TELEPHONE (OUTPATIENT)
Dept: CARDIOLOGY | Facility: CLINIC | Age: 53
End: 2023-10-19
Payer: MEDICARE

## 2023-10-19 NOTE — TELEPHONE ENCOUNTER
Caller: Bakari Rosales    Relationship to patient: Self    Best call back number: [phone]    Type of visit: FU    Requested date: ANY      If rescheduling, when is the original appointment: 10.19.23      Additional notes: PATIENT HAS COVID AND IS NEEDING TO RESCHEDULE. NO AVAILABILITY ON MY END OF THE SCHEDULE. PATIENT IS REQUESTING TO BE SEEN IN [place].

## 2023-11-16 ENCOUNTER — OFFICE VISIT (OUTPATIENT)
Dept: CARDIOLOGY | Facility: CLINIC | Age: 53
End: 2023-11-16
Payer: MEDICARE

## 2023-11-16 VITALS
HEIGHT: 70 IN | WEIGHT: 220 LBS | SYSTOLIC BLOOD PRESSURE: 136 MMHG | DIASTOLIC BLOOD PRESSURE: 75 MMHG | OXYGEN SATURATION: 97 % | HEART RATE: 78 BPM | BODY MASS INDEX: 31.5 KG/M2

## 2023-11-16 DIAGNOSIS — I10 PRIMARY HYPERTENSION: ICD-10-CM

## 2023-11-16 DIAGNOSIS — I48.4 ATYPICAL ATRIAL FLUTTER: ICD-10-CM

## 2023-11-16 DIAGNOSIS — I48.19 PERSISTENT ATRIAL FIBRILLATION: Primary | ICD-10-CM

## 2023-11-16 PROCEDURE — 99213 OFFICE O/P EST LOW 20 MIN: CPT | Performed by: INTERNAL MEDICINE

## 2023-11-16 PROCEDURE — 3075F SYST BP GE 130 - 139MM HG: CPT | Performed by: INTERNAL MEDICINE

## 2023-11-16 PROCEDURE — 3078F DIAST BP <80 MM HG: CPT | Performed by: INTERNAL MEDICINE

## 2023-11-16 PROCEDURE — 93000 ELECTROCARDIOGRAM COMPLETE: CPT | Performed by: INTERNAL MEDICINE

## 2023-11-16 RX ORDER — CITALOPRAM 20 MG/1
20 TABLET ORAL DAILY
COMMUNITY

## 2023-11-16 RX ORDER — OMEPRAZOLE 20 MG/1
1 CAPSULE, DELAYED RELEASE ORAL DAILY
COMMUNITY
Start: 2023-10-31

## 2023-11-16 NOTE — PROGRESS NOTES
Bakari Rosales  1970  [phone]    11/16/2023    [facility] CARDIOLOGY     Referring Provider: No ref. provider found     Sachin Roche PA  [address]   [facility] [address]    Chief Complaint   Patient presents with    Paroxysmal A-fib       Problem List:   Atrial fibrillation: CHADSvasc = 1  Holter monitor, 12/08/2010, with heart rates of , average 52 beats per minute with 3% PACs. No PVCs and normal sinus rhythm.   Echocardiogram, 12/03/2012: Normal study, EF 60%.   GXT, February 2013: Normal study per patient, no data.   Holter monitor, 07/30/2013, no data, showing apparent atrial fibrillation.  Transesophageal echocardiogram, August 2013, showing thrombus, Xarelto initiated, Rythmol changed to sotalol, no external cardioversion performed.   EKG, 08/17/2013, showed atrial fibrillation at 58 beats per minute.   Failed sotalol: Tikosyn initiation, 02/11/2014.   PV isolation procedure, 09/09/2014: Abnormal CT scan of the chest, 09/08/2014, with enlarged lymph nodes in the mediastinum.   PVA July 2016 with RFA of PV, LA roof, posterior wall, and RFA of RA flutter  Event Monitor 12/2016-1/2017 showing nsr, ST, atrial flutter, stopped Rythmol and started Flecainide  Echocardiogram 12/5/2019: EF 60-65%, no significant valve disease.   Emphysema/chronic obstructive pulmonary disease:  Abnormal CT scan of the chest, 09/08/2014 with enlarged lymph nodes in the mediastinum.   Repeat CT scan of the chest, 11/19/2014, reactive lymph nodes in the mediastinum, predominant interstitial nodularity and linear densities and possible emphysema with early fibrosis.   Repeat CT scan 10/2016: There are fairly subtle peripheral interstitial changes noted primarily in the lower lobes. However there are no features of kit pulmonary fibrosis. There is no bronchiectasis, evidence of acute inflammatory process or mass  GXT, February 2013: Normal study per patient, no data.    Hypothyroidism.  Dyslipidemia.  Anxiety.  Tobacco abuse.  Hypertension.  Anxiety/depression  Allergies  Allergies   Allergen Reactions    Codeine Nausea Only       Current Medications    Current Outpatient Medications:     albuterol sulfate  (90 Base) MCG/ACT inhaler, Inhale 2 puffs Every 4 (Four) Hours As Needed for Wheezing., Disp: 1 inhaler, Rfl: 11    ALPRAZolam (XANAX) 2 MG tablet, Take 1 tablet by mouth 3 (Three) Times a Day As Needed., Disp: , Rfl:     amLODIPine (NORVASC) 5 MG tablet, Take 1 tablet by mouth Daily., Disp: 30 tablet, Rfl: 11    atorvastatin (LIPITOR) 20 MG tablet, Take 1 tablet by mouth Daily., Disp: , Rfl:     citalopram (CeleXA) 20 MG tablet, Take 1 tablet by mouth Daily., Disp: , Rfl:     dilTIAZem CD (CARDIZEM CD) 240 MG 24 hr capsule, Take 1 capsule by mouth Daily., Disp: 30 capsule, Rfl: 6    fenofibrate (TRICOR) 145 MG tablet, Take 1 tablet by mouth Daily., Disp: , Rfl:     flecainide (TAMBOCOR) 100 MG tablet, Take 1 tablet by mouth 2 (Two) Times a Day., Disp: 60 tablet, Rfl: 6    furosemide (LASIX) 20 MG tablet, Take 1 tablet by mouth Daily As Needed., Disp: , Rfl:     levothyroxine (SYNTHROID, LEVOTHROID) 100 MCG tablet, Take 1 tablet by mouth Daily., Disp: , Rfl:     metoprolol tartrate (LOPRESSOR) 50 MG tablet, Take 1 tablet by mouth 2 (Two) Times a Day., Disp: 60 tablet, Rfl: 11    omeprazole (priLOSEC) 20 MG capsule, Take 1 capsule by mouth Daily., Disp: , Rfl:     rivaroxaban (XARELTO) 20 MG tablet, Take 1 tablet by mouth Daily With Dinner., Disp: , Rfl:     zolpidem (AMBIEN) 10 MG tablet, Take 1 tablet by mouth every night at bedtime., Disp: , Rfl: 0    Fluticasone Furoate-Vilanterol (BREO ELLIPTA) 200-25 MCG/INH inhaler, Inhale 1 puff Daily., Disp: 1 inhaler, Rfl: 5    History of Present Illness     Pt presents for follow up of AF/HTN. Since we last saw the pt, pt denies any sustained AF episodes, SOB, LH, and dizziness.  He does describe atypical chest pain.   "Apparently underwent left heart catheterization in the last 3 months with Dr. Moran that was within normal limits.  He is seeing a GI doctor for possible EGD.  Otherwise he has had no sustained arrhythmias and has done very well.  He is now off cigarettes for quite some time.  Blood pressure has been well controlled at home.        Vitals:    11/16/23 1303 11/16/23 1318   BP: 160/78 136/75   BP Location: Left arm    Patient Position: Sitting    Pulse: 78    SpO2: 97%    Weight: 99.8 kg (220 lb)    Height: 177.8 cm (70\")      Body mass index is 31.57 kg/m².  PE:  General: NAD  Neck: no JVD, no carotid bruits, no TM  Heart RRR, NL S1, S2, S4 present, no rubs, murmurs  Lungs: CTA, no wheezes, rhonchi, or rales  Abd: soft, non-tender, NL BS  Ext: No musculoskeletal deformities, no edema, cyanosis, or clubbing  Psych: normal mood and affect    Diagnostic Data:        ECG 12 Lead    Date/Time: 11/16/2023 1:09 PM  Performed by: Rick Luz MD    Authorized by: Rick Luz MD  Comparison: compared with previous ECG from 2/16/2023  Similar to previous ECG  Rhythm: sinus rhythm  BPM: 72               1. Persistent atrial fibrillation    2. Atypical atrial flutter    3. Primary hypertension          Plan:    1) PAF/AFL: Status post PVA x2 with last ablation in 2016.  Arrhythmias presently well-controlled on current medical therapy.  No changes for now.  Continue present medications.   2) Anticoagulation: CHADSVasc = 1  Continue Xarelto  3) HTN-repeat blood pressure today was stable.  Wt loss, exercise, salt reduction       F/up in 6 months      "

## 2024-07-11 ENCOUNTER — TELEPHONE (OUTPATIENT)
Dept: CARDIOLOGY | Facility: CLINIC | Age: 54
End: 2024-07-11
Payer: MEDICARE

## 2024-07-11 NOTE — TELEPHONE ENCOUNTER
Caller: Bakari Rosales    Relationship to patient: Self    Best call back number: [phone]      Type of visit: F/U APPT    Requested date: NEXT AVAILABLE (IN [place])    If rescheduling, when is the original appointment: 7-18-24     Additional notes:PLEASE CALL PATIENT WITH A SUITABLE DATE.

## 2025-01-16 ENCOUNTER — OFFICE VISIT (OUTPATIENT)
Dept: CARDIOLOGY | Facility: CLINIC | Age: 55
End: 2025-01-16
Payer: MEDICARE

## 2025-01-16 VITALS
HEIGHT: 70 IN | WEIGHT: 230 LBS | OXYGEN SATURATION: 96 % | SYSTOLIC BLOOD PRESSURE: 132 MMHG | DIASTOLIC BLOOD PRESSURE: 72 MMHG | HEART RATE: 77 BPM | BODY MASS INDEX: 32.93 KG/M2

## 2025-01-16 DIAGNOSIS — I48.0 PAROXYSMAL A-FIB: Primary | ICD-10-CM

## 2025-01-16 DIAGNOSIS — I10 PRIMARY HYPERTENSION: ICD-10-CM

## 2025-01-16 NOTE — PROGRESS NOTES
Bakari Rosales  1970  [phone]    01/16/2025    [facility] CARDIOLOGY     Referring Provider: No ref. provider found     Sachin Roche PA  [address]   [facility] [address]    Chief Complaint   Patient presents with    Persistent atrial fibrillation       Problem List:   Atrial fibrillation: CHADSvasc = 1  Holter monitor, 12/08/2010, with heart rates of , average 52 beats per minute with 3% PACs. No PVCs and normal sinus rhythm.   Echocardiogram, 12/03/2012: Normal study, EF 60%.   GXT, February 2013: Normal study per patient, no data.   Holter monitor, 07/30/2013, no data, showing apparent atrial fibrillation.  Transesophageal echocardiogram, August 2013, showing thrombus, Xarelto initiated, Rythmol changed to sotalol, no external cardioversion performed.   EKG, 08/17/2013, showed atrial fibrillation at 58 beats per minute.   Failed sotalol: Tikosyn initiation, 02/11/2014.   PV isolation procedure, 09/09/2014: Abnormal CT scan of the chest, 09/08/2014, with enlarged lymph nodes in the mediastinum.   PVA July 2016 with RFA of PV, LA roof, posterior wall, and RFA of RA flutter  Event Monitor 12/2016-1/2017 showing nsr, ST, atrial flutter, stopped Rythmol and started Flecainide  Echocardiogram 12/5/2019: EF 60-65%, no significant valve disease.   Emphysema/chronic obstructive pulmonary disease:  Abnormal CT scan of the chest, 09/08/2014 with enlarged lymph nodes in the mediastinum.   Repeat CT scan of the chest, 11/19/2014, reactive lymph nodes in the mediastinum, predominant interstitial nodularity and linear densities and possible emphysema with early fibrosis.   Repeat CT scan 10/2016: There are fairly subtle peripheral interstitial changes noted primarily in the lower lobes. However there are no features of kit pulmonary fibrosis. There is no bronchiectasis, evidence of acute inflammatory process or mass  GXT, February 2013: Normal study per  patient, no data.   Hypothyroidism.  Dyslipidemia.  Anxiety.  Tobacco abuse.  Hypertension.  Anxiety/depression    Allergies  Allergies   Allergen Reactions    Codeine Nausea Only       Current Medications    Current Outpatient Medications:     albuterol sulfate  (90 Base) MCG/ACT inhaler, Inhale 2 puffs Every 4 (Four) Hours As Needed for Wheezing., Disp: 1 inhaler, Rfl: 11    ALPRAZolam (XANAX) 2 MG tablet, Take 1 tablet by mouth 3 (Three) Times a Day As Needed., Disp: , Rfl:     amLODIPine (NORVASC) 5 MG tablet, Take 1 tablet by mouth Daily., Disp: 30 tablet, Rfl: 11    atorvastatin (LIPITOR) 20 MG tablet, Take 1 tablet by mouth Daily., Disp: , Rfl:     citalopram (CeleXA) 20 MG tablet, Take 1 tablet by mouth Daily., Disp: , Rfl:     dilTIAZem CD (CARDIZEM CD) 240 MG 24 hr capsule, Take 1 capsule by mouth Daily., Disp: 30 capsule, Rfl: 6    fenofibrate (TRICOR) 145 MG tablet, Take 1 tablet by mouth Daily., Disp: , Rfl:     flecainide (TAMBOCOR) 100 MG tablet, Take 1 tablet by mouth 2 (Two) Times a Day., Disp: 60 tablet, Rfl: 6    furosemide (LASIX) 20 MG tablet, Take 1 tablet by mouth Daily As Needed., Disp: , Rfl:     levothyroxine (SYNTHROID, LEVOTHROID) 100 MCG tablet, Take 1 tablet by mouth Daily., Disp: , Rfl:     metoprolol tartrate (LOPRESSOR) 50 MG tablet, Take 1 tablet by mouth 2 (Two) Times a Day., Disp: 60 tablet, Rfl: 11    omeprazole (priLOSEC) 20 MG capsule, Take 1 capsule by mouth Daily., Disp: , Rfl:     rivaroxaban (XARELTO) 20 MG tablet, Take 1 tablet by mouth Daily With Dinner., Disp: , Rfl:     zolpidem (AMBIEN) 10 MG tablet, Take 1 tablet by mouth every night at bedtime., Disp: , Rfl: 0    History of Present Illness:     Pt presents for follow up of AF, and HTN. Since we last saw the pt, pt denies any AF episodes.  Occasional short-lived palpitations that he does not feel as his atrial fibrillation.  He denies SOB, CP, LH, and dizziness. Denies any hospitalizations, ER visits, bleeding,  "or TIA/CVA symptoms. Overall feels well. BP is well controlled.           Vitals:    01/16/25 1143   BP: 132/72   BP Location: Left arm   Patient Position: Sitting   Pulse: 77   SpO2: 96%   Weight: 104 kg (230 lb)   Height: 177.8 cm (70\")     Body mass index is 33 kg/m².  PE:  General: NAD. A & Ox 3   Neck: no JVD, no carotid bruits, no TM  Heart RRR, NL S1, S2, S4 present, no rubs, murmurs  Lungs: CTA, no wheezes, rhonchi, or rales  Abd: soft, non-tender, NL BS  Ext: No musculoskeletal deformities, no edema, cyanosis, or clubbing  Psych: normal mood and affect    Diagnostic Data:        ECG 12 Lead    Date/Time: 1/16/2025 12:05 PM  Performed by: Bhavya Locke PA    Authorized by: Bhavya Locke PA  Comparison: compared with previous ECG from 11/16/2023  Rhythm: sinus rhythm  BPM: 78                 1. Paroxysmal A-fib    2. Primary hypertension          Plan:  1) PAF/AFL: Status post PVA x2 with last ablation in 2016.  Arrhythmias presently well-controlled on current medical therapy.  No changes for now.  Continue present medications.  QRS stable on EKG today on flecainide.  2) Anticoagulation: CHADSVasc = 1  Continue Xarelto  3) HTN- well controlled   Wt loss, exercise, salt reduction    F/up in 6 months    Electronically signed by DAYSI Patel, 01/16/25, 12:03 PM EST.      "